# Patient Record
Sex: FEMALE | Race: OTHER | Employment: UNEMPLOYED | URBAN - METROPOLITAN AREA
[De-identification: names, ages, dates, MRNs, and addresses within clinical notes are randomized per-mention and may not be internally consistent; named-entity substitution may affect disease eponyms.]

---

## 2017-08-08 ENCOUNTER — HOSPITAL ENCOUNTER (EMERGENCY)
Age: 32
Discharge: HOME | End: 2017-08-09
Attending: EMERGENCY MEDICINE | Admitting: EMERGENCY MEDICINE
Payer: SELF-PAY

## 2017-08-08 VITALS
TEMPERATURE: 98.1 F | OXYGEN SATURATION: 100 % | DIASTOLIC BLOOD PRESSURE: 85 MMHG | RESPIRATION RATE: 18 BRPM | SYSTOLIC BLOOD PRESSURE: 133 MMHG | HEART RATE: 74 BPM

## 2017-08-08 DIAGNOSIS — R11.0 NAUSEA WITHOUT VOMITING: ICD-10-CM

## 2017-08-08 DIAGNOSIS — R10.13 ABDOMINAL PAIN, ACUTE, EPIGASTRIC: Primary | ICD-10-CM

## 2017-08-08 PROCEDURE — 96361 HYDRATE IV INFUSION ADD-ON: CPT

## 2017-08-08 PROCEDURE — 96374 THER/PROPH/DIAG INJ IV PUSH: CPT

## 2017-08-08 PROCEDURE — 99283 EMERGENCY DEPT VISIT LOW MDM: CPT

## 2017-08-08 PROCEDURE — 96375 TX/PRO/DX INJ NEW DRUG ADDON: CPT

## 2017-08-08 NOTE — LETTER
NOTIFICATION RETURN TO WORK / SCHOOL 
 
8/9/2017 1:20 AM 
 
Ms. Faustina Carmichael [address] [facility] [address] [id number] To Whom It May Concern: 
 
Faustina Carmichael is currently under the care of [facility] EMERGENCY DEPT. She will return to work/school on: 8/10/17 If there are questions or concerns please have the patient contact our office. Sincerely, Gilmer Mon MD

## 2017-08-09 LAB
ALBUMIN SERPL BCP-MCNC: 3.3 G/DL (ref 3.4–5)
ALBUMIN/GLOB SERPL: 0.9 {RATIO} (ref 0.8–1.7)
ALP SERPL-CCNC: 115 U/L (ref 45–117)
ALT SERPL-CCNC: 37 U/L (ref 13–56)
ANION GAP BLD CALC-SCNC: 7 MMOL/L (ref 3–18)
APPEARANCE UR: NORMAL
AST SERPL W P-5'-P-CCNC: 21 U/L (ref 15–37)
BASOPHILS # BLD AUTO: 0 K/UL (ref 0–0.06)
BASOPHILS # BLD: 0 % (ref 0–2)
BILIRUB DIRECT SERPL-MCNC: <0.1 MG/DL (ref 0–0.2)
BILIRUB SERPL-MCNC: 0.2 MG/DL (ref 0.2–1)
BILIRUB UR QL: NEGATIVE
BUN SERPL-MCNC: 13 MG/DL (ref 7–18)
BUN/CREAT SERPL: 20 (ref 12–20)
CALCIUM SERPL-MCNC: 8.7 MG/DL (ref 8.5–10.1)
CHLORIDE SERPL-SCNC: 106 MMOL/L (ref 100–108)
CO2 SERPL-SCNC: 27 MMOL/L (ref 21–32)
COLOR UR: YELLOW
CREAT SERPL-MCNC: 0.66 MG/DL (ref 0.6–1.3)
DIFFERENTIAL METHOD BLD: ABNORMAL
EOSINOPHIL # BLD: 0.6 K/UL (ref 0–0.4)
EOSINOPHIL NFR BLD: 7 % (ref 0–5)
ERYTHROCYTE [DISTWIDTH] IN BLOOD BY AUTOMATED COUNT: 13.5 % (ref 11.6–14.5)
GLOBULIN SER CALC-MCNC: 3.7 G/DL (ref 2–4)
GLUCOSE SERPL-MCNC: 101 MG/DL (ref 74–99)
GLUCOSE UR STRIP.AUTO-MCNC: NEGATIVE MG/DL
HCG UR QL: NEGATIVE
HCT VFR BLD AUTO: 40.1 % (ref 35–45)
HGB BLD-MCNC: 13.3 G/DL (ref 12–16)
HGB UR QL STRIP: NEGATIVE
KETONES UR QL STRIP.AUTO: NEGATIVE MG/DL
LEUKOCYTE ESTERASE UR QL STRIP.AUTO: NEGATIVE
LIPASE SERPL-CCNC: 121 U/L (ref 73–393)
LYMPHOCYTES # BLD AUTO: 34 % (ref 21–52)
LYMPHOCYTES # BLD: 2.8 K/UL (ref 0.9–3.6)
MCH RBC QN AUTO: 30.4 PG (ref 24–34)
MCHC RBC AUTO-ENTMCNC: 33.2 G/DL (ref 31–37)
MCV RBC AUTO: 91.8 FL (ref 74–97)
MONOCYTES # BLD: 0.9 K/UL (ref 0.05–1.2)
MONOCYTES NFR BLD AUTO: 10 % (ref 3–10)
NEUTS SEG # BLD: 4 K/UL (ref 1.8–8)
NEUTS SEG NFR BLD AUTO: 49 % (ref 40–73)
NITRITE UR QL STRIP.AUTO: NEGATIVE
PH UR STRIP: 8 [PH] (ref 5–8)
PLATELET # BLD AUTO: 177 K/UL (ref 135–420)
PMV BLD AUTO: 11.9 FL (ref 9.2–11.8)
POTASSIUM SERPL-SCNC: 3.7 MMOL/L (ref 3.5–5.5)
PROT SERPL-MCNC: 7 G/DL (ref 6.4–8.2)
PROT UR STRIP-MCNC: NEGATIVE MG/DL
RBC # BLD AUTO: 4.37 M/UL (ref 4.2–5.3)
SODIUM SERPL-SCNC: 140 MMOL/L (ref 136–145)
SP GR UR REFRACTOMETRY: 1.02 (ref 1–1.03)
UROBILINOGEN UR QL STRIP.AUTO: 1 EU/DL (ref 0.2–1)
WBC # BLD AUTO: 8.3 K/UL (ref 4.6–13.2)

## 2017-08-09 PROCEDURE — 83690 ASSAY OF LIPASE: CPT | Performed by: EMERGENCY MEDICINE

## 2017-08-09 PROCEDURE — 74011250636 HC RX REV CODE- 250/636: Performed by: EMERGENCY MEDICINE

## 2017-08-09 PROCEDURE — 81003 URINALYSIS AUTO W/O SCOPE: CPT | Performed by: EMERGENCY MEDICINE

## 2017-08-09 PROCEDURE — 74011000250 HC RX REV CODE- 250: Performed by: EMERGENCY MEDICINE

## 2017-08-09 PROCEDURE — 80076 HEPATIC FUNCTION PANEL: CPT | Performed by: EMERGENCY MEDICINE

## 2017-08-09 PROCEDURE — 81025 URINE PREGNANCY TEST: CPT | Performed by: EMERGENCY MEDICINE

## 2017-08-09 PROCEDURE — 85025 COMPLETE CBC W/AUTO DIFF WBC: CPT | Performed by: EMERGENCY MEDICINE

## 2017-08-09 PROCEDURE — 80048 BASIC METABOLIC PNL TOTAL CA: CPT | Performed by: EMERGENCY MEDICINE

## 2017-08-09 RX ORDER — KETOROLAC TROMETHAMINE 30 MG/ML
INJECTION, SOLUTION INTRAMUSCULAR; INTRAVENOUS
Status: DISCONTINUED
Start: 2017-08-09 | End: 2017-08-09 | Stop reason: HOSPADM

## 2017-08-09 RX ORDER — FAMOTIDINE 10 MG/ML
INJECTION INTRAVENOUS
Status: DISCONTINUED
Start: 2017-08-09 | End: 2017-08-09 | Stop reason: HOSPADM

## 2017-08-09 RX ORDER — FAMOTIDINE 10 MG/ML
20 INJECTION INTRAVENOUS
Status: COMPLETED | OUTPATIENT
Start: 2017-08-09 | End: 2017-08-09

## 2017-08-09 RX ORDER — KETOROLAC TROMETHAMINE 30 MG/ML
30 INJECTION, SOLUTION INTRAMUSCULAR; INTRAVENOUS
Status: COMPLETED | OUTPATIENT
Start: 2017-08-09 | End: 2017-08-09

## 2017-08-09 RX ORDER — OMEPRAZOLE 20 MG/1
20 CAPSULE, DELAYED RELEASE ORAL DAILY
Qty: 14 CAP | Refills: 1 | Status: SHIPPED | OUTPATIENT
Start: 2017-08-09 | End: 2017-08-23

## 2017-08-09 RX ORDER — ACETAMINOPHEN 500 MG
1000 TABLET ORAL
Qty: 30 TAB | Refills: 0 | Status: SHIPPED | OUTPATIENT
Start: 2017-08-09 | End: 2018-09-28

## 2017-08-09 RX ORDER — ONDANSETRON 4 MG/1
4 TABLET, ORALLY DISINTEGRATING ORAL
Qty: 8 TAB | Refills: 0 | Status: SHIPPED | OUTPATIENT
Start: 2017-08-09 | End: 2018-07-27 | Stop reason: ALTCHOICE

## 2017-08-09 RX ORDER — MAG HYDROX/ALUMINUM HYD/SIMETH 200-200-20
30 SUSPENSION, ORAL (FINAL DOSE FORM) ORAL
Qty: 354 ML | Refills: 0 | Status: SHIPPED | OUTPATIENT
Start: 2017-08-09

## 2017-08-09 RX ORDER — ONDANSETRON 2 MG/ML
4 INJECTION INTRAMUSCULAR; INTRAVENOUS
Status: COMPLETED | OUTPATIENT
Start: 2017-08-09 | End: 2017-08-09

## 2017-08-09 RX ADMIN — ONDANSETRON 4 MG: 2 INJECTION INTRAMUSCULAR; INTRAVENOUS at 00:23

## 2017-08-09 RX ADMIN — KETOROLAC TROMETHAMINE 30 MG: 30 INJECTION, SOLUTION INTRAMUSCULAR at 00:36

## 2017-08-09 RX ADMIN — SODIUM CHLORIDE 1000 ML: 900 INJECTION, SOLUTION INTRAVENOUS at 00:23

## 2017-08-09 RX ADMIN — FAMOTIDINE 20 MG: 10 INJECTION, SOLUTION INTRAVENOUS at 00:36

## 2017-08-09 NOTE — ED PROVIDER NOTES
HPI Comments: Amanda Awad is a 27 y.o. Female with c/o upper mid abd pain, radiating to back for last 3 days with nausea. Worse with po intake. No vomiting, diarrhea, fcs, cp, cough, urinary sx. No h/o sig abd issues in past. Sharp, constant. Nothing taken    The history is provided by the patient. History reviewed. No pertinent past medical history. History reviewed. No pertinent surgical history. History reviewed. No pertinent family history. Social History     Social History    Marital status:      Spouse name: N/A    Number of children: N/A    Years of education: N/A     Occupational History    Not on file. Social History Main Topics    Smoking status: Never Smoker    Smokeless tobacco: Never Used    Alcohol use No    Drug use: No    Sexual activity: Not on file     Other Topics Concern    Not on file     Social History Narrative    No narrative on file         ALLERGIES: Review of patient's allergies indicates no known allergies. Review of Systems   Constitutional: Negative for fever. HENT: Negative for sore throat and trouble swallowing. Eyes: Negative for visual disturbance. Respiratory: Negative for cough and shortness of breath. Cardiovascular: Negative for chest pain. Gastrointestinal: Positive for abdominal pain and nausea. Negative for blood in stool, diarrhea and vomiting. Endocrine: Negative for polyuria. Genitourinary: Negative for difficulty urinating. Musculoskeletal: Negative for gait problem. Skin: Negative for rash. Allergic/Immunologic: Negative for immunocompromised state. Neurological: Negative for syncope. Hematological: Does not bruise/bleed easily. Psychiatric/Behavioral: Positive for sleep disturbance. Vitals:    08/08/17 2341   BP: 133/85   Pulse: 74   Resp: 18   Temp: 98.1 °F (36.7 °C)   SpO2: 100%            Physical Exam   Constitutional: She is oriented to person, place, and time.  She appears well-developed and well-nourished. Non-toxic appearance. She does not have a sickly appearance. She does not appear ill. No distress. HENT:   Head: Normocephalic and atraumatic. Right Ear: External ear normal.   Left Ear: External ear normal.   Nose: Nose normal.   Mouth/Throat: Uvula is midline, oropharynx is clear and moist and mucous membranes are normal.   Eyes: Conjunctivae are normal. No scleral icterus. Neck: Neck supple. Cardiovascular: Normal rate, regular rhythm, normal heart sounds and intact distal pulses. Pulmonary/Chest: Effort normal and breath sounds normal.   Abdominal: Soft. Normal appearance. She exhibits no distension and no mass. There is no hepatosplenomegaly. There is tenderness in the epigastric area. There is no rigidity, no rebound, no guarding, no CVA tenderness, no tenderness at McBurney's point and negative Alvarenga's sign. Musculoskeletal: She exhibits no edema. Neurological: She is alert and oriented to person, place, and time. Gait normal.   Skin: Skin is warm and dry. She is not diaphoretic. Psychiatric: Her behavior is normal.   Nursing note and vitals reviewed.        [facility]  ED Course       Procedures    Vitals:  Patient Vitals for the past 12 hrs:   Temp Pulse Resp BP SpO2   08/08/17 2341 98.1 °F (36.7 °C) 74 18 133/85 100 %         Medications ordered:   Medications   sodium chloride 0.9 % bolus infusion 1,000 mL (1,000 mL IntraVENous New Bag 8/9/17 0023)   ketorolac (TORADOL) 30 mg/mL (1 mL) injection (not administered)   famotidine (PF) (PEPCID) 20 mg/2 mL injection (not administered)   ondansetron (ZOFRAN) injection 4 mg (4 mg IntraVENous Given 8/9/17 0023)   ketorolac (TORADOL) injection 30 mg (30 mg IntraVENous Given 8/9/17 0036)   famotidine (PF) (PEPCID) injection 20 mg (20 mg IntraVENous Given 8/9/17 0036)         Lab findings:  Recent Results (from the past 12 hour(s))   CBC WITH AUTOMATED DIFF    Collection Time: 08/09/17 12:18 AM   Result Value Ref Range    WBC 8.3 4.6 - 13.2 K/uL    RBC 4.37 4.20 - 5.30 M/uL    HGB 13.3 12.0 - 16.0 g/dL    HCT 40.1 35.0 - 45.0 %    MCV 91.8 74.0 - 97.0 FL    MCH 30.4 24.0 - 34.0 PG    MCHC 33.2 31.0 - 37.0 g/dL    RDW 13.5 11.6 - 14.5 %    PLATELET 543 866 - 982 K/uL    MPV 11.9 (H) 9.2 - 11.8 FL    NEUTROPHILS 49 40 - 73 %    LYMPHOCYTES 34 21 - 52 %    MONOCYTES 10 3 - 10 %    EOSINOPHILS 7 (H) 0 - 5 %    BASOPHILS 0 0 - 2 %    ABS. NEUTROPHILS 4.0 1.8 - 8.0 K/UL    ABS. LYMPHOCYTES 2.8 0.9 - 3.6 K/UL    ABS. MONOCYTES 0.9 0.05 - 1.2 K/UL    ABS. EOSINOPHILS 0.6 (H) 0.0 - 0.4 K/UL    ABS. BASOPHILS 0.0 0.0 - 0.06 K/UL    DF AUTOMATED     HEPATIC FUNCTION PANEL    Collection Time: 08/09/17 12:18 AM   Result Value Ref Range    Protein, total 7.0 6.4 - 8.2 g/dL    Albumin 3.3 (L) 3.4 - 5.0 g/dL    Globulin 3.7 2.0 - 4.0 g/dL    A-G Ratio 0.9 0.8 - 1.7      Bilirubin, total 0.2 0.2 - 1.0 MG/DL    Bilirubin, direct <0.1 0.0 - 0.2 MG/DL    Alk.  phosphatase 115 45 - 117 U/L    AST (SGOT) 21 15 - 37 U/L    ALT (SGPT) 37 13 - 56 U/L   LIPASE    Collection Time: 08/09/17 12:18 AM   Result Value Ref Range    Lipase 121 73 - 932 U/L   METABOLIC PANEL, BASIC    Collection Time: 08/09/17 12:18 AM   Result Value Ref Range    Sodium 140 136 - 145 mmol/L    Potassium 3.7 3.5 - 5.5 mmol/L    Chloride 106 100 - 108 mmol/L    CO2 27 21 - 32 mmol/L    Anion gap 7 3.0 - 18 mmol/L    Glucose 101 (H) 74 - 99 mg/dL    BUN 13 7.0 - 18 MG/DL    Creatinine 0.66 0.6 - 1.3 MG/DL    BUN/Creatinine ratio 20 12 - 20      GFR est AA >60 >60 ml/min/1.73m2    GFR est non-AA >60 >60 ml/min/1.73m2    Calcium 8.7 8.5 - 10.1 MG/DL   URINALYSIS W/ RFLX MICROSCOPIC    Collection Time: 08/09/17 12:24 AM   Result Value Ref Range    Color YELLOW      Appearance CLOUDY      Specific gravity 1.021 1.005 - 1.030      pH (UA) 8.0 5.0 - 8.0      Protein NEGATIVE  NEG mg/dL    Glucose NEGATIVE  NEG mg/dL    Ketone NEGATIVE  NEG mg/dL    Bilirubin NEGATIVE  NEG      Blood NEGATIVE  NEG      Urobilinogen 1.0 0.2 - 1.0 EU/dL    Nitrites NEGATIVE  NEG      Leukocyte Esterase NEGATIVE  NEG     HCG URINE, QL    Collection Time: 08/09/17 12:24 AM   Result Value Ref Range    HCG urine, Ql. NEGATIVE  NEG         EKG interpretation by ED Physician:      X-Ray, CT or other radiology findings or impressions:  No orders to display       Progress notes, Consult notes or additional Procedure notes:   Doubt need for imaging; suspect gastritis, or gi related issue that does not require additional work up  I have discussed with patient and/or family/sig other the results, interpretation of any imaging if performed, suspected diagnosis and treatment plan to include instructions regarding the diagnoses listed to which understanding was expressed with all questions answered      Reevaluation of patient:   Stable for dc    Disposition:  Diagnosis:   1. Abdominal pain, acute, epigastric    2. Nausea without vomiting        Disposition: home      Follow-up Information     Follow up With Details Comments [address] [facility] ([address] [facility]) Schedule an appointment as soon as possible for a visit  [address] EMERGENCY DEPT  If symptoms worsen [address] [address] [phone]            Patient's Medications   Start Taking    ACETAMINOPHEN (TYLENOL EXTRA STRENGTH) 500 MG TABLET    Take 2 Tabs by mouth every six (6) hours as needed for Pain. ALUM-MAG HYDROXIDE-SIMETH (MYLANTA) 200-200-20 MG/5 ML SUSP    Take 30 mL by mouth four (4) times daily as needed. OMEPRAZOLE (PRILOSEC) 20 MG CAPSULE    Take 1 Cap by mouth daily for 14 days. ONDANSETRON (ZOFRAN ODT) 4 MG DISINTEGRATING TABLET    Take 1 Tab by mouth every eight (8) hours as needed for Nausea.    Continue Taking    No medications on file   These Medications have changed    No medications on file   Stop Taking    No medications on file

## 2017-08-09 NOTE — ED NOTES
Purposeful rounding complete:    Side rails up x2: YES  Bed low and wheels are locked: YES  Call bell in reach: YES  Comfort Addressed: YES  Toileting needs addressed: YES  Plan of care reviewed/updated with patient and family members: YES  IV site addressed: YES  Pain assessed and addressed: YES  Pain level:8    Patient provided a warm blanket for comfort.

## 2017-08-09 NOTE — ED NOTES
Patient lying in bed with visitor sitting on the end of the bed. Patient talking and playing a game on her cell phone with no noted distress at this time.

## 2017-08-09 NOTE — ED TRIAGE NOTES
Pt c/o upper abd pain with nausea that started 3 days ago. Denies vomiting or diarrhea. States she also has a headache.

## 2017-08-13 ENCOUNTER — HOSPITAL ENCOUNTER (OUTPATIENT)
Age: 32
Setting detail: OBSERVATION
Discharge: HOME | End: 2017-08-15
Attending: EMERGENCY MEDICINE | Admitting: HOSPITALIST
Payer: SELF-PAY

## 2017-08-13 ENCOUNTER — APPOINTMENT (OUTPATIENT)
Dept: CT IMAGING | Age: 32
End: 2017-08-13
Attending: EMERGENCY MEDICINE
Payer: SELF-PAY

## 2017-08-13 DIAGNOSIS — R42 DIZZINESS: Primary | ICD-10-CM

## 2017-08-13 LAB
ANION GAP BLD CALC-SCNC: 10 MMOL/L (ref 3–18)
APPEARANCE UR: CLEAR
BACTERIA URNS QL MICRO: ABNORMAL /HPF
BASOPHILS # BLD AUTO: 0 K/UL (ref 0–0.06)
BASOPHILS # BLD: 0 % (ref 0–2)
BILIRUB UR QL: NEGATIVE
BUN SERPL-MCNC: 13 MG/DL (ref 7–18)
BUN/CREAT SERPL: 21 (ref 12–20)
CALCIUM SERPL-MCNC: 8.7 MG/DL (ref 8.5–10.1)
CHLORIDE SERPL-SCNC: 109 MMOL/L (ref 100–108)
CO2 SERPL-SCNC: 24 MMOL/L (ref 21–32)
COLOR UR: YELLOW
CREAT SERPL-MCNC: 0.63 MG/DL (ref 0.6–1.3)
DIFFERENTIAL METHOD BLD: ABNORMAL
EOSINOPHIL # BLD: 0.5 K/UL (ref 0–0.4)
EOSINOPHIL NFR BLD: 6 % (ref 0–5)
EPITH CASTS URNS QL MICRO: ABNORMAL /LPF (ref 0–5)
ERYTHROCYTE [DISTWIDTH] IN BLOOD BY AUTOMATED COUNT: 13.3 % (ref 11.6–14.5)
GLUCOSE SERPL-MCNC: 86 MG/DL (ref 74–99)
GLUCOSE UR STRIP.AUTO-MCNC: NEGATIVE MG/DL
HCG UR QL: NEGATIVE
HCT VFR BLD AUTO: 40.7 % (ref 35–45)
HGB BLD-MCNC: 13.8 G/DL (ref 12–16)
HGB UR QL STRIP: NEGATIVE
KETONES UR QL STRIP.AUTO: NEGATIVE MG/DL
LEUKOCYTE ESTERASE UR QL STRIP.AUTO: ABNORMAL
LYMPHOCYTES # BLD AUTO: 30 % (ref 21–52)
LYMPHOCYTES # BLD: 2.5 K/UL (ref 0.9–3.6)
MAGNESIUM SERPL-MCNC: 2.2 MG/DL (ref 1.6–2.6)
MCH RBC QN AUTO: 30.9 PG (ref 24–34)
MCHC RBC AUTO-ENTMCNC: 33.9 G/DL (ref 31–37)
MCV RBC AUTO: 91.1 FL (ref 74–97)
MONOCYTES # BLD: 0.9 K/UL (ref 0.05–1.2)
MONOCYTES NFR BLD AUTO: 11 % (ref 3–10)
NEUTS SEG # BLD: 4.4 K/UL (ref 1.8–8)
NEUTS SEG NFR BLD AUTO: 53 % (ref 40–73)
NITRITE UR QL STRIP.AUTO: NEGATIVE
PH UR STRIP: 7 [PH] (ref 5–8)
PLATELET # BLD AUTO: 166 K/UL (ref 135–420)
PMV BLD AUTO: 11.4 FL (ref 9.2–11.8)
POTASSIUM SERPL-SCNC: 3.9 MMOL/L (ref 3.5–5.5)
PROT UR STRIP-MCNC: NEGATIVE MG/DL
RBC # BLD AUTO: 4.47 M/UL (ref 4.2–5.3)
RBC #/AREA URNS HPF: ABNORMAL /HPF (ref 0–5)
SODIUM SERPL-SCNC: 143 MMOL/L (ref 136–145)
SP GR UR REFRACTOMETRY: 1 (ref 1–1.03)
UROBILINOGEN UR QL STRIP.AUTO: 0.2 EU/DL (ref 0.2–1)
WBC # BLD AUTO: 8.3 K/UL (ref 4.6–13.2)
WBC URNS QL MICRO: ABNORMAL /HPF (ref 0–4)

## 2017-08-13 PROCEDURE — 96361 HYDRATE IV INFUSION ADD-ON: CPT

## 2017-08-13 PROCEDURE — 70450 CT HEAD/BRAIN W/O DYE: CPT

## 2017-08-13 PROCEDURE — 74011250636 HC RX REV CODE- 250/636: Performed by: EMERGENCY MEDICINE

## 2017-08-13 PROCEDURE — 96360 HYDRATION IV INFUSION INIT: CPT

## 2017-08-13 PROCEDURE — 80048 BASIC METABOLIC PNL TOTAL CA: CPT | Performed by: EMERGENCY MEDICINE

## 2017-08-13 PROCEDURE — 81025 URINE PREGNANCY TEST: CPT | Performed by: EMERGENCY MEDICINE

## 2017-08-13 PROCEDURE — 83735 ASSAY OF MAGNESIUM: CPT | Performed by: EMERGENCY MEDICINE

## 2017-08-13 PROCEDURE — 99285 EMERGENCY DEPT VISIT HI MDM: CPT

## 2017-08-13 PROCEDURE — 74011250637 HC RX REV CODE- 250/637: Performed by: EMERGENCY MEDICINE

## 2017-08-13 PROCEDURE — 81001 URINALYSIS AUTO W/SCOPE: CPT | Performed by: EMERGENCY MEDICINE

## 2017-08-13 PROCEDURE — 96372 THER/PROPH/DIAG INJ SC/IM: CPT

## 2017-08-13 PROCEDURE — 85025 COMPLETE CBC W/AUTO DIFF WBC: CPT | Performed by: EMERGENCY MEDICINE

## 2017-08-13 RX ORDER — ACETAMINOPHEN 325 MG/1
650 TABLET ORAL
Status: COMPLETED | OUTPATIENT
Start: 2017-08-13 | End: 2017-08-13

## 2017-08-13 RX ORDER — MECLIZINE HCL 12.5 MG 12.5 MG/1
50 TABLET ORAL
Status: COMPLETED | OUTPATIENT
Start: 2017-08-13 | End: 2017-08-13

## 2017-08-13 RX ORDER — SODIUM CHLORIDE 9 MG/ML
1000 INJECTION, SOLUTION INTRAVENOUS ONCE
Status: COMPLETED | OUTPATIENT
Start: 2017-08-13 | End: 2017-08-13

## 2017-08-13 RX ADMIN — ACETAMINOPHEN 650 MG: 325 TABLET, FILM COATED ORAL at 22:11

## 2017-08-13 RX ADMIN — MECLIZINE 50 MG: 12.5 TABLET ORAL at 22:11

## 2017-08-13 RX ADMIN — SODIUM CHLORIDE 1000 ML: 900 INJECTION, SOLUTION INTRAVENOUS at 22:14

## 2017-08-13 NOTE — IP AVS SNAPSHOT
[address] [address] 
[phone] Patient: Mahendra Navarro MRN: [id number] :[id number] Current Discharge Medication List  
  
CONTINUE these medications which have NOT CHANGED Dose & Instructions Dispensing Information Comments Morning Noon Evening Bedtime  
 acetaminophen 500 mg tablet Commonly known as:  [address] [facility] Your last dose was: Your next dose is:    
   
   
 Dose:  1000 mg Take 2 Tabs by mouth every six (6) hours as needed for Pain. Quantity:  30 Tab Refills:  0  
     
   
   
   
  
 alum-mag hydroxide-simeth 200-200-20 mg/5 mL Susp Commonly known as:  MYLANTA Your last dose was: Your next dose is:    
   
   
 Dose:  30 mL Take 30 mL by mouth four (4) times daily as needed. Quantity:  354 mL Refills:  0  
     
   
   
   
  
 omeprazole 20 mg capsule Commonly known as:  PRILOSEC Your last dose was: Your next dose is:    
   
   
 Dose:  20 mg Take 1 Cap by mouth daily for 14 days. Quantity:  14 Cap Refills:  1  
     
   
   
   
  
 ondansetron 4 mg disintegrating tablet Commonly known as:  ZOFRAN ODT Your last dose was: Your next dose is:    
   
   
 Dose:  4 mg Take 1 Tab by mouth every eight (8) hours as needed for Nausea. Quantity:  8 Tab Refills:  0

## 2017-08-13 NOTE — IP AVS SNAPSHOT
[address] [place] [address] 
[phone] Patient: Amanda Awad MRN: [id number] :1985 You are allergic to the following No active allergies Recent Documentation Height Weight BMI Smoking Status 1.6 m 74.8 kg 29.23 kg/m2 Never Smoker Emergency Contacts Name Discharge Info Relation Home Work Mobile Anjum Collazo DISCHARGE CAREGIVER [3] Spouse [3] [phone] About your hospitalization You were admitted on:  2017 You last received care in the:  [facility] [address] You were discharged on:  August 15, 2017 Unit phone number:  [phone] Why you were hospitalized Your primary diagnosis was:  Hypotension Your diagnoses also included:  Dizziness Providers Seen During Your Hospitalizations Provider Role Specialty Primary office phone Neomia Bence, DO Attending Provider Emergency Medicine [phone] Sobeida Licea MD Attending Provider Internal Medicine [phone] Najma Vu DO Attending Provider Internal Medicine [phone] Your Primary Care Physician (PCP) Primary Care Physician Office Phone Office Fax John Villarreal [phone] Follow-up Information Follow up With Details Comments Contact Info Find a primary care doctor [facility] Schedule an appointment as soon as possible for a visit  [address] [place] [address] [address] [place] [address]  
[phone] Fahad Martinez NP On 2017 at 1:00 pm [place] [address] [address] [phone] 
[phone] Your Appointments 2017  1:30 PM EDT [place] [address] [address] [facility] [address] [phone]  
[phone] Current Discharge Medication List  
  
 CONTINUE these medications which have NOT CHANGED Dose & Instructions Dispensing Information Comments Morning Noon Evening Bedtime  
 acetaminophen 500 mg tablet Commonly known as:  [address] Your last dose was: Your next dose is:    
   
   
 Dose:  1000 mg Take 2 Tabs by mouth every six (6) hours as needed for Pain. Quantity:  30 Tab Refills:  0  
     
   
   
   
  
 alum-mag hydroxide-simeth 200-200-20 mg/5 mL Susp Commonly known as:  MYLANTA Your last dose was: Your next dose is:    
   
   
 Dose:  30 mL Take 30 mL by mouth four (4) times daily as needed. Quantity:  354 mL Refills:  0  
     
   
   
   
  
 omeprazole 20 mg capsule Commonly known as:  PRILOSEC Your last dose was: Your next dose is:    
   
   
 Dose:  20 mg Take 1 Cap by mouth daily for 14 days. Quantity:  14 Cap Refills:  1  
     
   
   
   
  
 ondansetron 4 mg disintegrating tablet Commonly known as:  ZOFRAN ODT Your last dose was: Your next dose is:    
   
   
 Dose:  4 mg Take 1 Tab by mouth every eight (8) hours as needed for Nausea. Quantity:  8 Tab Refills:  0 Discharge Instructions CHECK YOUR BLOOD PRESSURE DAILY, CHECK WHEN LIGHT HEADED 
 
EAT A HIGH SALT DIET 
 
 
 
DISCHARGE SUMMARY from Nurse The following personal items are in your possession at time of discharge: 
  
  
PATIENT INSTRUCTIONS: 
 
 
F-face looks uneven A-arms unable to move or move unevenly S-speech slurred or non-existent T-time-call 911 as soon as signs and symptoms begin-DO NOT go Back to bed or wait to see if you get better-TIME IS BRAIN.  
 
Warning Signs of HEART ATTACK  
 
 Call 911 if you have these symptoms: 
? Chest discomfort. Most heart attacks involve discomfort in the center of the chest that lasts more than a few minutes, or that goes away and comes back. It can feel like uncomfortable pressure, squeezing, fullness, or pain. ? Discomfort in other areas of the upper body. Symptoms can include pain or discomfort in one or both arms, the back, neck, jaw, or stomach. ? Shortness of breath with or without chest discomfort. ? Other signs may include breaking out in a cold sweat, nausea, or lightheadedness. Don't wait more than five minutes to call [address]! Fast action can save your life. Calling 911 is almost always the fastest way to get lifesaving treatment. Emergency Medical Services staff can begin treatment when they arrive  up to an hour sooner than if someone gets to the hospital by car. The discharge information has been reviewed with the patient. The patient verbalized understanding. Discharge medications reviewed with the patient and appropriate educational materials and side effects teaching were provided. Presión arterial baja: Instrucciones de cuidado - [ Low Blood Pressure: Care Instructions ] Instrucciones de cuidado La presión arterial es la medida de la fuerza que ejerce la christofer contra las salas de los vasos sanguíneos briana y después de cada latido del corazón. La presión arterial baja (hipotensión) significa que la presión arterial está mucho más baja de lo normal. Algunas personas, especialmente las mujeres jóvenes y [place], podrían tener larisa presión ligeramente baja sin síntomas. Sin embargo, en [employer] la presión arterial baja puede causar síntomas dirk mareo o aturdimiento. Cuando la presión arterial está demasiado baja, el corazón, el cerebro y otros órganos no reciben suficiente christofer.  
La presión arterial baja puede tener muchas causas, dirk problemas cardíacos y algunos medicamentos. La diabetes no controlada, larisa reacción alérgica grave o larisa infección pueden hacer que la presión arterial baje. Otra causa es la deshidratación, es decir, cuando el cuerpo pierde demasiado líquido. El tratamiento para la presión arterial baja depende de la causa. La atención de seguimiento es larisa parte clave de dickerson tratamiento y seguridad. Asegúrese de hacer y acudir a todas las citas, y llame a dickerson médico si está teniendo problemas. También es larisa buena idea saber los resultados de nora exámenes y mantener larisa lista de los medicamentos que waqar. Cómo puede cuidarse en el hogar? · Tara abundantes líquidos, los suficientes para que dickerson orina sea de color amarillo steven o transparente dirk el agua. Si tiene [place] [employer] [facility] [employer], el corazón o el hígado y tiene que Frandy's líquidos, hable con dickerson médico antes de aumentar dickerson consumo. · Sea curly con los medicamentos. Llame a dickerson médico si tootie estar teniendo un problema con dickerson medicamento. Recibirá [employer] medicamentos específicos recetados por dickerson médico. 
· Póngase de pie o levántese de la cama muy lentamente para que el cuerpo se ajuste. · Descanse lo suficiente. · No fume. Fumar aumenta el riesgo de tener un ataque al corazón. Si necesita ayuda para dejar de fumar, hable con dickerson médico sobre programas y medicamentos para dejar de fumar. Estos pueden aumentar nora probabilidades de dejar el hábito para siempre. · Limite el alcohol a 2 bebidas al día si es hombre, y 1 bebida al día si es tima. El alcohol podría interferir en la acción de dickerson medicamento. Además, el alcohol puede hacer que la presión arterial baja empeore ya que hace que el cuerpo pierda agua. Cuándo debe pedir ayuda? Llame al 911 en cualquier momento que considere que necesita atención de [place]. Por ejemplo, llame si: · Tiene síntomas de un ataque al corazón. Estos pueden incluir: ¨ Dolor o presión en el pecho, o larisa sensación extraña en el pecho. ¨ Sudoración. ¨ Falta de aire. ¨ Náuseas o vómito. ¨ Dolor, presión o larisa sensación extraña en la espalda, el elle, la mandíbula, la parte superior del abdomen o en addi o ambos hombros o brazos. ¨ Aturdimiento o debilidad repentina. ¨ Latidos del corazón rápidos o irregulares. Después de llamar al 911, es posible que el operador le diga que mastique 1 aspirina para adultos o de 2 a 4 aspirinas de dosis baja. Espere a larisa ambulancia. No intente conducir usted mismo. · Tiene síntomas de un ataque cerebral. Estos pueden incluir: 
¨ Entumecimiento, hormigueo, debilidad o parálisis repentinos en la carol, el brazo o la pierna, sobre todo si ocurre en un solo lado del cuerpo. ¨ Cambios súbitos en la vista. ¨ Problemas repentinos para hablar. ¨ Confusión súbita o dificultad repentina para comprender frases sencillas. ¨ Problemas repentinos para caminar o mantener el equilibrio. ¨ Un dolor de dusty intenso y repentino, distinto a los cristine de dusty anteriores. · Se desmayó (perdió el conocimiento). Llame a dickerson médico ahora mismo o busque atención médica inmediata si: 
· Siente mareo o aturdimiento, o siente que se va a desmayar. · Tiene señales de necesitar más líquidos. Tiene los ojos hundidos y la boca seca, y [place] solo poca cantidad de color oscuro. · No puede retener líquidos en el estómago. Preste especial atención a los cambios en dickerson dori y asegúrese de comunicarse con dickerson médico si: 
· No mejora dirk se esperaba. Dónde puede encontrar más información en inglés? Netta Raza a http://jules-debbie.info/. Escriba C304 en la búsqueda para aprender más acerca de \"Presión arterial baja: Instrucciones de cuidado - [ Low Blood Pressure: Care Instructions ]. \" 
Revisado: 21 christine, 2016 Versión del contenido: 11.3 © 5151-4607 boarding pass, Incorporated.  Las instrucciones de cuidado fueron adaptadas bajo licencia por Good Help Connections (which disclaims liability or warranty for this information). Si usted tiene [place] afección médica o sobre estas instrucciones, siempre pregunte a dickerson profesional de dori. [facility], Incorporated niega toda garantía o responsabilidad por dickerson uso de esta información. RECOMMEND HIGH SALT DIET Mareos: Instrucciones de cuidado - [ Dizziness: Care Instructions ] Instrucciones de cuidado Rodney Litten son [place] sensación de inestabilidad o confusión en la dusty. Son distintos al vértigo, larisa sensación de que la habitación gira o de que usted se mueve o . También es distinto del aturdimiento, que es la sensación de que está a punto de desmayarse. Puede resultar difícil conocer la causa de los [place]. Algunas personas se sienten mareadas cuando tienen migrañas. A veces, los episodios gripales pueden hacer que se sienta mareado. Algunas afecciones médicas, dirk los problemas cardíacos o la presión arterial katherine, pueden hacer que se sienta mareado. Muchos medicamentos pueden causar mareos, dirk los que se usan para la presión arterial katherine, el dolor o la ansiedad. Si es un medicamento el que está causando los síntomas, dickerson médico podría recomendarle que lo cambie o deje de tomarlo. Si es un problema cardíaco, podría necesitar medicamentos para ayudar a que dickerson corazón funcione mejor. Si no hay razón aparente para los síntomas, dickerson médico podría sugerir vigilar y esperar briana un tiempo para emma si los mareos desaparecen por sí solos. La atención de seguimiento es larisa parte clave de dickerson tratamiento y seguridad. Asegúrese de hacer y acudir a todas las citas, y llame a dickerson médico si está teniendo problemas. También es larisa buena idea saber los resultados de nora exámenes y mantener larisa lista de los medicamentos que waqar. Cómo puede cuidarse en el hogar?  
· Si dickerson médico le recomienda o receta medicamentos, tómelos exactamente según las indicaciones. Llame a dickerson médico si tootie estar teniendo un problema con dickerson medicamento. · No conduzca mientras se sienta mareado. · Trate de prevenir las caídas. Mynor Solan que puede kian son: ¨ Usar tapetes antideslizantes, agregar agarraderas cerca de la tonya y usar luces nocturnas. ¨ Ordenar dickerson casa de larry manera que en los senderos no haya nada con lo que se pueda tropezar. ¨ Avisarles a familiares y [facility] que se ha estado sintiendo [place]. [place] les servirá para saber cómo ayudarle. Cuándo debe pedir ayuda? Llame al 911 en cualquier momento que considere que necesita atención de [place]. Por ejemplo, llame si: 
· Se desmayó (perdió el conocimiento). · Tiene mareos junto con síntomas de un ataque cardíaco. Estos pueden incluir: ¨ Dolor de [place], o presión o larisa sensación extraña en el pecho. ¨ Sudoración. ¨ Falta de aire. ¨ Náuseas o vómito. ¨ Dolor, presión, o larisa sensación extraña en la espalda, el elle, la mandíbula o el abdomen superior, o en addi o ambos hombros o brazos. ¨ Aturdimiento o debilidad repentina. ¨ Un latido cardíaco rápido o irregular. · Tiene síntomas de un ataque cerebral. Estos pueden incluir: 
¨ Entumecimiento, hormigueo, debilidad o parálisis repentinos en la carol, el brazo o la pierna, sobre todo si ocurre en un solo lado del cuerpo. ¨ Cambios súbitos en la vista. ¨ Problemas repentinos para hablar. ¨ Confusión súbita o dificultad repentina para comprender frases sencillas. ¨ Problemas repentinos para caminar o mantener el equilibrio. ¨ Un dolor de dusty intenso y repentino, distinto a los cristine de dusty anteriores. Llame a dickerson médico ahora mismo o busque atención médica inmediata si: 
· Se siente mareado y [place], dolor de [place] o zumbido FINXI oídos. · Tiene nuevas náuseas y vómito o [address] · Rachelle mareos no desaparecen o regresan.  
Preste especial atención a los cambios en dickerson dori y asegúrese de comunicarse con dickerson médico si: 
· No mejora dirk se esperaba. Dónde puede encontrar más información en inglés? Mike Tovar a http://jules-debbie.info/. Sera Caesar A911 en la búsqueda para aprender más acerca de \"Mareos: Instrucciones de cuidado - [ Dizziness: Care Instructions ]. \" 
Revisado: 20 marzo, 2017 Versión del contenido: 11.3 © 6652-5270 Healthwise, Incorporated. Las instrucciones de cuidado fueron adaptadas bajo licencia por Good BeMy[employer] Connections (which disclaims liability or warranty for this information). Si usted tiene [place] afección médica o sobre estas instrucciones, siempre pregunte a dickerson profesional de dori. Healthwise, Incorporated niega toda garantía o responsabilidad por dickerson uso de esta información. Aturdimiento o desmayo: Instrucciones de cuidado - [ Lurlean Rump or Faintness: Care Instructions ] Instrucciones de cuidado El aturdimiento es la sensación de que está a punto de desmayarse o de \"perder el conocimiento\". No se siente dirk si usted o lo que le rodea se [place]. Es distinto del vértigo, que es la sensación de que usted o las cosas que le rodean dan vueltas o se inclinan. El aturdimiento suele desaparecer o mejorar cuando se acuesta. Si el aturdimiento empeora, esto puede conducir a un desvanecimiento. Es común sentirse aturdido de [employer]. Por lo general, el aturdimiento no se debe a un problema grave. A menudo es causado por larisa disminución de corta duración de la presión arterial y el flujo de christofer hacia la dusty que se produce al ponerse de pie con demasiada rapidez cuando está acostado o sentado. La atención de seguimiento es larisa parte clave de dickerson tratamiento y seguridad. Asegúrese de hacer y acudir a todas las citas, y llame a dickerson médico si está teniendo problemas. También es larisa buena idea saber los resultados de nora exámenes y mantener larisa lista de los medicamentos que waqar. Cómo puede cuidarse en el hogar? · Acuéstese briana 1 o 2 minutos cuando se sienta aturdido. Después de acostarse, siéntese lentamente y permanezca sentado de 1 a 2 minutos antes de ponerse de pie lentamente. · Evite los movimientos, las posturas o las actividades que le hayan producido aturdimiento en el pasado. · Descanse mucho, en especial si está resfriado o tiene gripe, ya que estas pueden causar aturdimiento. · Asegúrese de beber abundante líquido, en especial si tiene fiebre o si ha estado sudando. · No conduzca ni se ponga a sí mismo o ponga a otros en peligro mientras se sienta aturdido. Cuándo debe pedir ayuda? Llame al 911 en cualquier momento que considere que necesita atención de [place]. Por ejemplo, llame si: · Tiene síntomas de un ataque cerebral. Estos pueden incluir: 
¨ Entumecimiento, hormigueo, debilidad o parálisis repentinos en la carol, el brazo o la pierna, sobre todo si ocurre en un solo lado del cuerpo. ¨ Cambios súbitos en la vista. ¨ Problemas repentinos para hablar. ¨ Confusión súbita o dificultad repentina para comprender frases sencillas. ¨ Problemas repentinos para caminar o mantener el equilibrio. ¨ Un dolor de dusty intenso y repentino, distinto a los cristine de dusty anteriores. · Tiene síntomas de un ataque al corazón. Estos podrían incluir: ¨ Dolor o presión en el pecho, o larisa sensación extraña en el pecho. ¨ Sudoración. ¨ Falta de aire. ¨ Náuseas o vómito. ¨ Dolor, presión o larisa sensación extraña en la espalda, el elle, la mandíbula, la parte superior del abdomen, o en addi o ambos hombros o brazos. ¨ Aturdimiento o debilidad repentina. ¨ Latidos cardíacos rápidos o irregulares. Cuando llame al 911, es posible que le digan que mastique 1 aspirina para adultos o 2 a 4 aspirinas de dosis baja. Espere a la ambulancia. No trate de conducir usted mismo un automóvil.  
Preste especial atención a los cambios en dickerson dori y asegúrese de comunicarse con dickerson médico si: 
 · El aturdimiento empeora o no mejora con los cuidados en el hogar. Dónde puede encontrar más información en inglés? Delano Dominguez a http://jules-debbie.info/. Marivel Lozano T218 en la búsqueda para aprender más acerca de \"Aturdimiento o desmayo: Instrucciones de cuidado - [ Mumtaz Neth or Faintness: Care Instructions ]. \" 
Revisado: 20 marzo, 2017 Versión del contenido: 11.3 © 1865-1857 Healthwise, Incorporated. Las instrucciones de cuidado fueron adaptadas bajo licencia por Good Help Connections (which disclaims liability or warranty for this information). Si usted tiene [place] afección médica o sobre estas instrucciones, siempre pregunte a dickerson profesional de dori. Healthwise, Incorporated niega toda garantía o responsabilidad por dickerson uso de esta información. Discharge Orders None Yieldbot Announcement We are excited to announce that we are making your provider's discharge notes available to you in Yieldbot. You will see these notes when they are completed and signed by the physician that discharged you from your recent hospital stay. If you have any questions or concerns about any information you see in Yieldbot, please call the Health Information Department where you were seen or reach out to your Primary Care Provider for more information about your plan of care. Introducing [facility]! [facility] introduce portal paciente Yieldbot . Ahora se puede acceder a partes de dickerson expediente médico, enviar por correo electrónico la oficina de dickerson médico y solicitar renovaciones de medicamentos en línea. En dickerson navegador de Internet , [place] a https://[email or web address]. [email or web address]. [email or web address]/Stream Tags Cesar clic en el usuario por Elizabeth Yung? Emilee hernándezic aquí en la sesión Lauran Holstein. Verá la página de registro [place]. Ingrese dickerson código de [facility] larry y dirk aparece a continuación.  Usted no tendrá que [employer] código después de charles completado el proceso de registro . Si usted no se inscribe antes de la fecha de caducidad , debe solicitar un nuevo código. · MyChart Código de acceso : [id number] Expires: 11/7/2017  1:26 AM 
 
Hussein Auguste los últimos cuatro dígitos de dickerson Número de Seguro Social ( xxxx ) y fecha de nacimiento ( dd / mm / aaaa ) dirk se indica y cesar clic en Enviar. Usted será llevado a la siguiente página de registro . Crear un ID MyChart . Esta será dickerson ID de inicio de sesión de MyChart y no puede ser [place] , por lo que pensar en larisa que es Debroah Laila y fácil de recordar . Crear larisa contraseña MyChart . Usted puede cambiar dickerson contraseña en cualquier momento . Ingrese dickerson Password Reset de preguntas y [place] . [place] se puede utilizar en un momento posterior si usted olvida dickerson contraseña. Introduzca dickerson dirección de correo electrónico . Leo Tirado recibirá larisa notificación por correo electrónico cuando la nueva información está disponible en MyChart . Juvencio cavazos en Registrarse. Olam Bustle emma y descargar porciones de dickerson expediente médico. 
Cesar clic en el enlace de descarga del menú Resumen para descargar larisa copia portátil de dickerson información médica . Si tiene [employer] , por favor visite la sección de preguntas frecuentes del sitio web MyChart . Recuerde, MyChart NO es que se utilizará para las necesidades urgentes. Para emergencias médicas , llame al 911 . Ahora disponible en dickerson iPhone y Android ! General Information Please provide this summary of care documentation to your next provider. Patient Signature:  ____________________________________________________________ Date:  ____________________________________________________________  
  
Juan M Ala Provider Signature:  ____________________________________________________________ Date:  ____________________________________________________________  
  
  
   
  
Ramila Ghazi 
 
 
 [address] [place] [address] [phone] Patient: Mindy Saunders MRN: [id number] :1985 Tiene alergias a lo siguiente No tiene alergias Documentación recientes Height Weight BMI ([facility]) Estatus de tabaquísmo 1.6 m 74.8 kg 29.23 kg/m2 Never Smoker Emergency Contacts Name Discharge Info Relation Home Work Mobile Collazo,Anjum DISCHARGE CAREGIVER [3] Spouse [3] [phone] Sobre avalos hospitalización Le admitieron el:  2017 [facility]os tratamiento más reciente fue el:  [address] CARDIAC TELE Le dieron de katherine el:  August 15, 2017 Número de teléfono de la unidad:  [phone] Por qué le ingresaron [facility] diagnosis primaria es:  Hypotension [facility] diagnosis también incluye:  Dizziness Proveedores de verse briana rachelle hospitalizaciones Personal Médico Rol Especialidad Teléfono principal de la oficina Craig Diaz DO Attending Provider Emergency Medicine [phone] Tanya Villanueva MD Attending Provider Internal Medicine [phone] Gonzalo Lopez DO Attending Provider Internal Medicine [phone] [facility] médico de atención primaria (PCP ) Primary Care Physician Office Phone Office Fax Diana Yeager [phone] Follow-up Information Follow up With Details Comments Contact Info Find a primary care doctor Acc Clinic Schedule an appointment as soon as possible for a visit  [address] [place] [address] [address] [facility] 
[phone] Brennen Bai NP On 2017 at 1:00 pm [address] [facility] [address] [address] 
[phone] Rachelle citas 2017  1:30 PM EDT [place] [address] [address]) [address] [facility] [address]  [address] [address]  
[phone] Aprobación de la gestión actual lista de medicamentos CONTINUAR estos medicamentos que no [facility] [place] Dosis e instrucciones Información de dispensación Comentarios Morning Noon Evening Bedtime  
 acetaminophen 500 mg tablet También conocido dirk:  TYLENOL EXTRA STRENGTH Your last dose was: Your next dose is:    
   
   
 Dosis:  1000 mg Take 2 Tabs by mouth every six (6) hours as needed for Pain. cantidad:  30 Tab  
recargas:  0  
     
   
   
   
  
 alum-mag hydroxide-simeth 563-485-76 mg/5 mL Susp También conocido dirk:  MYLANTA Your last dose was: Your next dose is:    
   
   
 Dosis:  30 mL Take 30 mL by mouth four (4) times daily as needed. cantidad:  354 mL  
recargas:  0  
     
   
   
   
  
 omeprazole 20 mg capsule También conocido dirk:  Threkarleya Jose Your last dose was: Your next dose is:    
   
   
 Dosis:  20 mg Take 1 Cap by mouth daily for 14 days. cantidad:  14 Cap  
recargas:  1  
     
   
   
   
  
 ondansetron 4 mg disintegrating tablet También conocido dirk:  ZOFRAN ODT Your last dose was: Your next dose is:    
   
   
 Dosis:  4 mg Take 1 Tab by mouth every eight (8) hours as needed for Nausea. cantidad:  8 Tab  
recargas:  0 Discharge Instructions CHECK YOUR BLOOD PRESSURE DAILY, CHECK WHEN LIGHT HEADED 
 
EAT A HIGH SALT DIET 
 
 
 
DISCHARGE SUMMARY from Nurse The following personal items are in your possession at time of discharge: 
  
  
PATIENT INSTRUCTIONS: 
 
 
F-face looks uneven A-arms unable to move or move unevenly S-speech slurred or non-existent T-time-call 911 as soon as signs and symptoms begin-DO NOT go  
 Back to bed or wait to see if you get better-TIME IS BRAIN. Warning Signs of HEART ATTACK Call 911 if you have these symptoms: 
? Chest discomfort. Most heart attacks involve discomfort in the center of the chest that lasts more than a few minutes, or that goes away and comes back. It can feel like uncomfortable pressure, squeezing, fullness, or pain. ? Discomfort in other areas of the upper body. Symptoms can include pain or discomfort in one or both arms, the back, neck, jaw, or stomach. ? Shortness of breath with or without chest discomfort. ? Other signs may include breaking out in a cold sweat, nausea, or lightheadedness. Don't wait more than five minutes to call [address]! Fast action can save your life. Calling 911 is almost always the fastest way to get lifesaving treatment. Emergency Medical Services staff can begin treatment when they arrive  up to an hour sooner than if someone gets to the hospital by car. The discharge information has been reviewed with the patient. The patient verbalized understanding. Discharge medications reviewed with the patient and appropriate educational materials and side effects teaching were provided. Presión arterial baja: Instrucciones de cuidado - [ Low Blood Pressure: Care Instructions ] Instrucciones de cuidado La presión arterial es la medida de la fuerza que ejerce la christofer contra las salas de los vasos sanguíneos briana y después de cada latido del corazón. La presión arterial baja (hipotensión) significa que la presión arterial está mucho más baja de lo normal. Algunas personas, especialmente las mujeres jóvenes y [place], podrían tener larisa presión ligeramente baja sin síntomas. Sin embargo, en [facility] la presión arterial baja puede causar síntomas dirk mareo o aturdimiento. Cuando la presión arterial está demasiado baja, el corazón, el cerebro y otros órganos no reciben suficiente christofer. La presión arterial baja puede tener muchas causas, dirk problemas cardíacos y algunos medicamentos. La diabetes no controlada, larisa reacción alérgica grave o larisa infección pueden hacer que la presión arterial baje. Otra causa es la deshidratación, es decir, cuando el cuerpo pierde demasiado líquido. El tratamiento para la presión arterial baja depende de la causa. La atención de seguimiento es larisa parte clave de dickerson tratamiento y seguridad. Asegúrese de hacer y acudir a todas las citas, y llame a dickerson médico si está teniendo problemas. También es larisa buena idea saber los resultados de nora exámenes y mantener larisa lista de los medicamentos que waqar. Cómo puede cuidarse en el hogar? · Tara abundantes líquidos, los suficientes para que dickerson orina sea de color amarillo steven o transparente dirk el agua. Si tiene [place] [facility], el corazón o el hígado y tiene que [place]'s líquidos, hable con dickerson médico antes de aumentar dickerson consumo. · Sea curly con los medicamentos. Llame a dickerson médico si tootie estar teniendo un problema con dickerson medicamento. Recibirá [employer] medicamentos específicos recetados por dickerson médico. 
· Póngase de pie o levántese de la cama muy lentamente para que el cuerpo se ajuste. · Descanse lo suficiente. · No fume. Fumar aumenta el riesgo de tener un ataque al corazón. Si necesita ayuda para dejar de fumar, hable con dickerson médico sobre programas y medicamentos para dejar de fumar. Estos pueden aumentar nora probabilidades de dejar el hábito para siempre. · Limite el alcohol a 2 bebidas al día si es hombre, y 1 bebida al día si es tima. El alcohol podría interferir en la acción de dickerson medicamento. Además, el alcohol puede hacer que la presión arterial baja empeore ya que hace que el cuerpo pierda agua. Cuándo debe pedir ayuda? Llame al 911 en cualquier momento que considere que necesita atención de [place].  Por ejemplo, llame si: 
 · Tiene síntomas de un ataque al corazón. Estos pueden incluir: ¨ Dolor o presión en el pecho, o larisa sensación extraña en el pecho. ¨ Sudoración. ¨ Falta de aire. ¨ Náuseas o vómito. ¨ Dolor, presión o larisa sensación extraña en la espalda, el elle, la mandíbula, la parte superior del abdomen o en addi o ambos hombros o brazos. ¨ Aturdimiento o debilidad repentina. ¨ Latidos del corazón rápidos o irregulares. Después de llamar al 911, es posible que el operador le diga que mastique 1 aspirina para adultos o de 2 a 4 aspirinas de dosis baja. Espere a larisa ambulancia. No intente conducir usted mismo. · Tiene síntomas de un ataque cerebral. Estos pueden incluir: 
¨ Entumecimiento, hormigueo, debilidad o parálisis repentinos en la carol, el brazo o la pierna, sobre todo si ocurre en un solo lado del cuerpo. ¨ Cambios súbitos en la vista. ¨ Problemas repentinos para hablar. ¨ Confusión súbita o dificultad repentina para comprender frases sencillas. ¨ Problemas repentinos para caminar o mantener el equilibrio. ¨ Un dolor de dusty intenso y repentino, distinto a los cristine de dusty anteriores. · Se desmayó (perdió el conocimiento). Llame a dickerson médico ahora mismo o busque atención médica inmediata si: 
· Siente mareo o aturdimiento, o siente que se va a desmayar. · Tiene señales de necesitar más líquidos. Tiene los ojos hundidos y la boca seca, y [place] solo poca cantidad de color oscuro. · No puede retener líquidos en el estómago. Preste especial atención a los cambios en dickerson dori y asegúrese de comunicarse con dickerson médico si: 
· No mejora dirk se esperaba. Dónde puede encontrar más información en inglés? Anopo Rubio a [email or web address].info/. Escriba C304 en la búsqueda para aprender más acerca de \"Presión arterial baja: Instrucciones de cuidado - [ Low Blood Pressure: Care Instructions ]. \" 
Revisado: 21 christine, 2016 Versión del contenido: 11.3 © 9963-7589 Healthwise, Incorporated. Las instrucciones de cuidado fueron adaptadas bajo licencia por Good [employer] (which disclaims liability or warranty for this information). Si usted tiene [place] afección médica o sobre estas instrucciones, siempre pregunte a dickerson profesional de dori. Healthwise, Incorporated niega toda garantía o responsabilidad por dickerson uso de esta información. RECOMMEND HIGH SALT DIET Mareos: Instrucciones de cuidado - [ Dizziness: Care Instructions ] Instrucciones de cuidado [place] son Andrea Friday sensación de inestabilidad o confusión en la dusty. Son distintos al vértigo, larisa sensación de que la habitación gira o de que usted se mueve o . También es distinto del aturdimiento, que es la sensación de que está a punto de desmayarse. Puede resultar difícil conocer la causa de los [place]. Algunas personas se sienten mareadas cuando tienen migrañas. A veces, los episodios gripales pueden hacer que se sienta mareado. Algunas afecciones médicas, dirk los problemas cardíacos o la presión arterial katherine, pueden hacer que se sienta mareado. Muchos medicamentos pueden causar mareos, dirk los que se usan para la presión arterial katherine, el dolor o la ansiedad. Si es un medicamento el que está causando los síntomas, dickerson médico podría recomendarle que lo cambie o deje de tomarlo. Si es un problema cardíaco, podría necesitar medicamentos para ayudar a que dickerson corazón funcione mejor. Si no hay razón aparente para los síntomas, dickerson médico podría sugerir vigilar y esperar briana un tiempo para emma si los mareos desaparecen por sí solos. La atención de seguimiento es larisa parte clave de dickerson tratamiento y seguridad. Asegúrese de hacer y acudir a todas las citas, y llame a dickerson médico si está teniendo problemas. También es larisa buena idea saber los resultados de nora exámenes y mantener larisa lista de los medicamentos que waqar. Cómo puede cuidarse en el hogar? · Si dickerson médico le recomienda o receta medicamentos, tómelos exactamente según las indicaciones. Llame a dickerson médico si tootie estar teniendo un problema con dickerson medicamento. · No conduzca mientras se sienta mareado. · Trate de prevenir las caídas. Justino Bolus que puede kian son: ¨ Usar tapetes antideslizantes, agregar agarraderas cerca de la tonya y usar luces nocturnas. ¨ Ordenar dickerson casa de larry manera que en los senderos no haya nada con lo que se pueda tropezar. ¨ Avisarles a familiares y [facility] que se ha estado sintiendo [place]. [place] les servirá para saber cómo ayudarle. Cuándo debe pedir ayuda? Llame al 911 en cualquier momento que considere que necesita atención de [place]. Por ejemplo, llame si: 
· Se desmayó (perdió el conocimiento). · Tiene mareos junto con síntomas de un ataque cardíaco. Estos pueden incluir: ¨ Dolor de [place], o presión o larisa sensación extraña en el pecho. ¨ Sudoración. ¨ Falta de aire. ¨ Náuseas o vómito. ¨ Dolor, presión, o larisa sensación extraña en la espalda, el elle, la mandíbula o el abdomen superior, o en addi o ambos hombros o brazos. ¨ Aturdimiento o debilidad repentina. ¨ Un latido cardíaco rápido o irregular. · Tiene síntomas de un ataque cerebral. Estos pueden incluir: 
¨ Entumecimiento, hormigueo, debilidad o parálisis repentinos en la carol, el brazo o la pierna, sobre todo si ocurre en un solo lado del cuerpo. ¨ Cambios súbitos en la vista. ¨ Problemas repentinos para hablar. ¨ Confusión súbita o dificultad repentina para comprender frases sencillas. ¨ Problemas repentinos para caminar o mantener el equilibrio. ¨ Un dolor de dusty intenso y repentino, distinto a los cristine de dusty anteriores. Llame a dickerson médico ahora mismo o busque atención médica inmediata si: 
· Se siente mareado y [place], dolor de [place] o zumbido [employer] oídos. · Tiene nuevas náuseas y vómito o [address] · Rachelle mareos no desaparecen o regresan. Preste especial atención a los cambios en dickerson dori y asegúrese de comunicarse con dickerson médico si: 
· No mejora dirk se esperaba. Dónde puede encontrar más información en inglés? Radha Adhikari a [email or web address]-debbie.info/. Amira Mijares D597 en la búsqueda para aprender más acerca de \"Mareos: Instrucciones de cuidado - [ Dizziness: Care Instructions ]. \" 
Revisado: 20 marzo, 2017 Versión del contenido: 11.3 © 7049-4668 Healthwise, Incorporated. Las instrucciones de cuidado fueron adaptadas bajo licencia por [employer] (which disclaims liability or warranty for this information). Si usted tiene [place] afección médica o sobre estas instrucciones, siempre pregunte a dickerson profesional de dori. Healthwise, Incorporated niega toda garantía o responsabilidad por dickerson uso de esta información. Aturdimiento o desmayo: Instrucciones de cuidado - [ [place] or Faintness: Care Instructions ] Instrucciones de cuidado El aturdimiento es la sensación de que está a punto de desmayarse o de \"perder el conocimiento\". No se siente dirk si usted o lo que le rodea se [place]. Es distinto del vértigo, que es la sensación de que usted o las cosas que le rodean dan vueltas o se inclinan. El aturdimiento suele desaparecer o mejorar cuando se acuesta. Si el aturdimiento empeora, esto puede conducir a un desvanecimiento. Es común sentirse aturdido de [employer]. Por lo general, el aturdimiento no se debe a un problema grave. A menudo es causado por larisa disminución de corta duración de la presión arterial y el flujo de christofer hacia la dusty que se produce al ponerse de pie con demasiada rapidez cuando está acostado o sentado. La atención de seguimiento es larisa parte clave de dickerson tratamiento y seguridad. Asegúrese de hacer y acudir a todas las citas, y llame a dickerson médico si está teniendo problemas.  También es larisa buena idea [employer] resultados de nora exámenes y mantener larisa lista de los medicamentos que waqar. Cómo puede cuidarse en el hogar? · Acuéstese briana 1 o 2 minutos cuando se sienta aturdido. Después de acostarse, siéntese lentamente y permanezca sentado de 1 a 2 minutos antes de ponerse de pie lentamente. · Evite los movimientos, las posturas o las actividades que le hayan producido aturdimiento en el pasado. · Descanse mucho, en especial si está resfriado o tiene gripe, ya que estas pueden causar aturdimiento. · Asegúrese de beber abundante líquido, en especial si tiene fiebre o si ha estado sudando. · No conduzca ni se ponga a sí mismo o ponga a otros en peligro mientras se sienta aturdido. Cuándo debe pedir ayuda? Llame al 911 en cualquier momento que considere que necesita atención de [place]. Por ejemplo, llame si: · Tiene síntomas de un ataque cerebral. Estos pueden incluir: 
¨ Entumecimiento, hormigueo, debilidad o parálisis repentinos en la carol, el brazo o la pierna, sobre todo si ocurre en un solo lado del cuerpo. ¨ Cambios súbitos en la vista. ¨ Problemas repentinos para hablar. ¨ Confusión súbita o dificultad repentina para comprender frases sencillas. ¨ Problemas repentinos para caminar o mantener el equilibrio. ¨ Un dolor de dusty intenso y repentino, distinto a los cristine de dusty anteriores. · Tiene síntomas de un ataque al corazón. Estos podrían incluir: ¨ Dolor o presión en el pecho, o larisa sensación extraña en el pecho. ¨ Sudoración. ¨ Falta de aire. ¨ Náuseas o vómito. ¨ Dolor, presión o larisa sensación extraña en la espalda, el elle, la mandíbula, la parte superior del abdomen, o en addi o ambos hombros o brazos. ¨ Aturdimiento o debilidad repentina. ¨ Latidos cardíacos rápidos o irregulares. Cuando llame al 911, es posible que le digan que mastique 1 aspirina para adultos o 2 a 4 aspirinas de dosis baja. Espere a la ambulancia. No trate de conducir usted mismo un automóvil. Preste especial atención a los cambios en dickerson dori y asegúrese de comunicarse con dickerson médico si: 
· El aturdimiento [place] o no mejora con los cuidados en el hogar. Dónde puede encontrar más información en inglés? Mike Tovar a [email or web address]-debbie.info/. Sera Maynard F340 en la búsqueda para aprender más acerca de \"Aturdimiento o desmayo: Instrucciones de cuidado - [ Lurlean Rump or Faintness: Care Instructions ]. \" 
Revisado: 20 marzo, 2017 Versión del contenido: 11.3 © 9906-1648 Healthwise, Incorporated. Las instrucciones de cuidado fueron adaptadas bajo licencia por Good Help Connections (which disclaims liability or warranty for this information). Si usted tiene [place] afección médica o sobre estas instrucciones, siempre pregunte a dickerson profesional de dori. Healthwise, Incorporated niega toda garantía o responsabilidad por dickerson uso de esta información. Discharge Orders [place] Announcement We are excited to announce that we are making your provider's discharge notes available to you in StreamLine Call. You will see these notes when they are completed and signed by the physician that discharged you from your recent hospital stay. If you have any questions or concerns about any information you see in StreamLine Call, please call the Health Information Department where you were seen or reach out to your Primary Care Provider for more information about your plan of care. Introducing [facility]! [facility] introduce portal paciente StreamLine Call . Ahora se puede acceder a partes de dickerson expediente médico, enviar por correo electrónico la oficina de dickerson médico y solicitar renovaciones de medicamentos en línea. En dickerson navegador de Internet , Celeste Bernardo a [email or web address] Cesar dirk en el usuario por [place]? Osmar cavazos aquí en la sesión Seferino Daughters. Verá la página de registro [place]. Ingrese dickerson código de [employer] larry y dirk aparece a continuación. Usted no tendrá que [employer] código después de charles completado el proceso de registro . Si usted no se inscribe antes de la fecha de caducidad , debe solicitar un nuevo código. · MyChart Código de acceso : [id number] Expires: 11/7/2017  1:26 AM 
 
Genesis Box los últimos cuatro dígitos de dickerson Número de Seguro Social ( xxxx ) y fecha de nacimiento ( dd / mm / aaaa ) dirk se indica y cesar clic en Enviar. Usted será llevado a la siguiente página de registro . Crear un ID MyChart . Esta será dickerson ID de inicio de sesión de MyChart y no puede ser [place] , por lo que pensar en larisa que es [place] y fácil de recordar . Crear larisa contraseña MyChart . Usted puede cambiar dickerson contraseña en cualquier momento . Ingrese dickerson Password Reset de preguntas y [place] . [place] se puede utilizar en un momento posterior si usted olvida dickerson contraseña. Introduzca dickerson dirección de correo electrónico . Tressa Fernández recibirá larisa notificación por correo electrónico cuando la nueva información está disponible en MyChart . Mary Alice cavazos en Registrarse. Ryan Juarez emma y descargar porciones de dickerson expediente médico. 
Cesar dirk en el enlace de descarga del menú Resumen para descargar larisa copia portátil de dickerson información médica . Si tiene [employer] , por favor visite la sección de preguntas frecuentes del sitio web MyChart . Recuerde, MyChart NO es que se utilizará para las necesidades urgentes. Para emergencias médicas , llame al 911 . Ahora disponible en dickerson iPhone y Android ! General Information Please provide this summary of care documentation to your next provider. Patient Signature:  ____________________________________________________________ Date:  ____________________________________________________________  
  
Siobhan Landsman Provider Signature:  ____________________________________________________________ Date:  ____________________________________________________________

## 2017-08-14 ENCOUNTER — APPOINTMENT (OUTPATIENT)
Dept: MRI IMAGING | Age: 32
End: 2017-08-14
Attending: HOSPITALIST
Payer: SELF-PAY

## 2017-08-14 ENCOUNTER — APPOINTMENT (OUTPATIENT)
Dept: MRI IMAGING | Age: 32
End: 2017-08-14
Attending: EMERGENCY MEDICINE
Payer: SELF-PAY

## 2017-08-14 PROBLEM — R42 DIZZINESS: Status: ACTIVE | Noted: 2017-08-14

## 2017-08-14 LAB — GLUCOSE BLD STRIP.AUTO-MCNC: 115 MG/DL (ref 70–110)

## 2017-08-14 PROCEDURE — 74011250636 HC RX REV CODE- 250/636: Performed by: EMERGENCY MEDICINE

## 2017-08-14 PROCEDURE — 74011250636 HC RX REV CODE- 250/636: Performed by: INTERNAL MEDICINE

## 2017-08-14 PROCEDURE — 74011250637 HC RX REV CODE- 250/637: Performed by: HOSPITALIST

## 2017-08-14 PROCEDURE — 70546 MR ANGIOGRAPH HEAD W/O&W/DYE: CPT

## 2017-08-14 PROCEDURE — 82962 GLUCOSE BLOOD TEST: CPT

## 2017-08-14 PROCEDURE — 74011250636 HC RX REV CODE- 250/636: Performed by: HOSPITALIST

## 2017-08-14 PROCEDURE — 70544 MR ANGIOGRAPHY HEAD W/O DYE: CPT

## 2017-08-14 PROCEDURE — 70549 MR ANGIOGRAPH NECK W/O&W/DYE: CPT

## 2017-08-14 PROCEDURE — A9585 GADOBUTROL INJECTION: HCPCS | Performed by: INTERNAL MEDICINE

## 2017-08-14 PROCEDURE — 77030021566 MRA NECK W WO CONT

## 2017-08-14 PROCEDURE — 70551 MRI BRAIN STEM W/O DYE: CPT

## 2017-08-14 PROCEDURE — 99218 HC RM OBSERVATION: CPT

## 2017-08-14 PROCEDURE — 77030020263 HC SOL INJ SOD CL0.9% LFCR 1000ML

## 2017-08-14 PROCEDURE — A9585 GADOBUTROL INJECTION: HCPCS | Performed by: EMERGENCY MEDICINE

## 2017-08-14 RX ORDER — ACETAMINOPHEN 325 MG/1
650 TABLET ORAL
Status: DISCONTINUED | OUTPATIENT
Start: 2017-08-14 | End: 2017-08-15 | Stop reason: HOSPADM

## 2017-08-14 RX ORDER — MAG HYDROX/ALUMINUM HYD/SIMETH 200-200-20
30 SUSPENSION, ORAL (FINAL DOSE FORM) ORAL
Status: DISCONTINUED | OUTPATIENT
Start: 2017-08-14 | End: 2017-08-15 | Stop reason: HOSPADM

## 2017-08-14 RX ORDER — SODIUM CHLORIDE 9 MG/ML
100 INJECTION, SOLUTION INTRAVENOUS CONTINUOUS
Status: DISCONTINUED | OUTPATIENT
Start: 2017-08-14 | End: 2017-08-15 | Stop reason: HOSPADM

## 2017-08-14 RX ORDER — MECLIZINE HCL 12.5 MG 12.5 MG/1
25 TABLET ORAL
Status: DISCONTINUED | OUTPATIENT
Start: 2017-08-14 | End: 2017-08-15 | Stop reason: HOSPADM

## 2017-08-14 RX ORDER — PANTOPRAZOLE SODIUM 40 MG/1
40 TABLET, DELAYED RELEASE ORAL
Status: DISCONTINUED | OUTPATIENT
Start: 2017-08-14 | End: 2017-08-15 | Stop reason: HOSPADM

## 2017-08-14 RX ORDER — ONDANSETRON 4 MG/1
4 TABLET, ORALLY DISINTEGRATING ORAL
Status: DISCONTINUED | OUTPATIENT
Start: 2017-08-14 | End: 2017-08-15 | Stop reason: HOSPADM

## 2017-08-14 RX ORDER — ENOXAPARIN SODIUM 100 MG/ML
40 INJECTION SUBCUTANEOUS EVERY 24 HOURS
Status: DISCONTINUED | OUTPATIENT
Start: 2017-08-14 | End: 2017-08-15 | Stop reason: HOSPADM

## 2017-08-14 RX ADMIN — PANTOPRAZOLE SODIUM 40 MG: 40 TABLET, DELAYED RELEASE ORAL at 09:58

## 2017-08-14 RX ADMIN — GADOBUTROL 7.5 ML: 604.72 INJECTION INTRAVENOUS at 01:34

## 2017-08-14 RX ADMIN — GADOBUTROL 15 ML: 604.72 INJECTION INTRAVENOUS at 15:29

## 2017-08-14 RX ADMIN — SODIUM CHLORIDE 100 ML/HR: 900 INJECTION, SOLUTION INTRAVENOUS at 06:21

## 2017-08-14 RX ADMIN — SODIUM CHLORIDE 1000 ML: 900 INJECTION, SOLUTION INTRAVENOUS at 13:50

## 2017-08-14 RX ADMIN — SODIUM CHLORIDE 100 ML/HR: 900 INJECTION, SOLUTION INTRAVENOUS at 17:17

## 2017-08-14 RX ADMIN — ENOXAPARIN SODIUM 40 MG: 40 INJECTION SUBCUTANEOUS at 09:57

## 2017-08-14 NOTE — ED NOTES
Second page placed to hospitalist, no return phone call  Hypotension persists, now low 20'P systolic sitting up. No longer 88 systolic but still dropping from high 90's when sat up initially. Continuous fluids are still infusing.

## 2017-08-14 NOTE — H&P
[facility]  Hospitalist Division      History & Physical    Patient: Bianca Chester MRN: [id number]  CSN: [id number]    YOB: 1985  Age: 27 y.o. Sex: female    DOA: 8/13/2017 LOS:  LOS: 0 days        DOA: 8/13/2017        Assessment/Plan     Active Problems:    Dizziness (8/14/2017)        Plan:  1. Dizziness - likely BPV -- will start Meclizine - Tele Neuro consulted , recommended MRI , MRV - negative but now would like to do MRA head & neck  -- will do Obs admit   2. Headache - Tylenol prn   DVT Px - Lovenox   Full code         HPI:     Bianca Chester is a 27 y.o. female who is being admitted to the hosp for dizziness. Pt is [place] speaking only but her  is by bedside who mentions that she developed dizziness yesterday & it got worse so she came to the ER for further eval. Dizziness assoc with nausea , no vomiting , no fever , no neck rigidity , no confusion. She feels like the room is spinning. She has a remote h/o ? Cerebral occlusion - sounds more like a sinus thrombosis. Tele neuro consulted - initially recommended MRI , MRV - both of which are negative but per their note would also like MRA head & neck - - pt being placed on Obs for further eval.     History reviewed. No pertinent past medical history. History reviewed. No pertinent surgical history. History reviewed. No pertinent family history. Social History     Social History    Marital status:      Spouse name: N/A    Number of children: N/A    Years of education: N/A     Social History Main Topics    Smoking status: Never Smoker    Smokeless tobacco: Never Used    Alcohol use No    Drug use: No    Sexual activity: Not Asked     Other Topics Concern    None     Social History Narrative       Prior to Admission medications    Medication Sig Start Date End Date Taking?  Authorizing Provider   omeprazole (PRILOSEC) 20 mg capsule Take 1 Cap by mouth daily for 14 days. 8/9/17 8/23/17  Alverto You MD   ondansetron (ZOFRAN ODT) 4 mg disintegrating tablet Take 1 Tab by mouth every eight (8) hours as needed for Nausea. 8/9/17   Alverto You MD   alum-mag hydroxide-simeth (MYLANTA) 200-200-20 mg/5 mL susp Take 30 mL by mouth four (4) times daily as needed. 8/9/17   Alverto You MD   acetaminophen (TYLENOL EXTRA STRENGTH) 500 mg tablet Take 2 Tabs by mouth every six (6) hours as needed for Pain. 8/9/17   Alverto You MD       No Known Allergies    Review of Systems  A comprehensive review of systems was negative except for that written in the History of Present Illness. Physical Exam:      Visit Vitals    /45    Pulse 79    Temp 98.4 °F (36.9 °C)    Resp 16    Ht 5' 3\" (1.6 m)    Wt 75 kg (165 lb 5.5 oz)    SpO2 97%    BMI 29.29 kg/m2       Physical Exam:    Gen: In general, this is a well nourished female in no acute distress  HEENT: Sclerae nonicteric. Oral mucous membranes moist. Dentition normal  Neck: Supple with midline trachea. CV: RRR without murmur or rub appreciated. Resp:Respirations are unlabored without use of accessory muscles. Lung fields bilaterally without wheezes or rhonchi. Abd: Soft, nontender, nondistended. Extrem: Extremities are warm, without cyanosis or clubbing. No pitting pretibial edema. Palpable distal pulses X 4.   Skin: Warm, no visible rashes. Neuro: Patient is alert, oriented, and cooperative. No obvious focal defects. Moves all 4 extremities.     Labs Reviewed:    Recent Results (from the past 24 hour(s))   CBC WITH AUTOMATED DIFF    Collection Time: 08/13/17  9:30 PM   Result Value Ref Range    WBC 8.3 4.6 - 13.2 K/uL    RBC 4.47 4.20 - 5.30 M/uL    HGB 13.8 12.0 - 16.0 g/dL    HCT 40.7 35.0 - 45.0 %    MCV 91.1 74.0 - 97.0 FL    MCH 30.9 24.0 - 34.0 PG    MCHC 33.9 31.0 - 37.0 g/dL    RDW 13.3 11.6 - 14.5 %    PLATELET 108 803 - 882 K/uL    MPV 11.4 9.2 - 11.8 FL    NEUTROPHILS 53 40 - 73 % LYMPHOCYTES 30 21 - 52 %    MONOCYTES 11 (H) 3 - 10 %    EOSINOPHILS 6 (H) 0 - 5 %    BASOPHILS 0 0 - 2 %    ABS. NEUTROPHILS 4.4 1.8 - 8.0 K/UL    ABS. LYMPHOCYTES 2.5 0.9 - 3.6 K/UL    ABS. MONOCYTES 0.9 0.05 - 1.2 K/UL    ABS. EOSINOPHILS 0.5 (H) 0.0 - 0.4 K/UL    ABS.  BASOPHILS 0.0 0.0 - 0.06 K/UL    DF AUTOMATED     METABOLIC PANEL, BASIC    Collection Time: 08/13/17  9:30 PM   Result Value Ref Range    Sodium 143 136 - 145 mmol/L    Potassium 3.9 3.5 - 5.5 mmol/L    Chloride 109 (H) 100 - 108 mmol/L    CO2 24 21 - 32 mmol/L    Anion gap 10 3.0 - 18 mmol/L    Glucose 86 74 - 99 mg/dL    BUN 13 7.0 - 18 MG/DL    Creatinine 0.63 0.6 - 1.3 MG/DL    BUN/Creatinine ratio 21 (H) 12 - 20      GFR est AA >60 >60 ml/min/1.73m2    GFR est non-AA >60 >60 ml/min/1.73m2    Calcium 8.7 8.5 - 10.1 MG/DL   MAGNESIUM    Collection Time: 08/13/17  9:30 PM   Result Value Ref Range    Magnesium 2.2 1.6 - 2.6 mg/dL   URINALYSIS W/ RFLX MICROSCOPIC    Collection Time: 08/13/17 10:55 PM   Result Value Ref Range    Color YELLOW      Appearance CLEAR      Specific gravity 1.005 1.005 - 1.030      pH (UA) 7.0 5.0 - 8.0      Protein NEGATIVE  NEG mg/dL    Glucose NEGATIVE  NEG mg/dL    Ketone NEGATIVE  NEG mg/dL    Bilirubin NEGATIVE  NEG      Blood NEGATIVE  NEG      Urobilinogen 0.2 0.2 - 1.0 EU/dL    Nitrites NEGATIVE  NEG      Leukocyte Esterase LARGE (A) NEG     HCG URINE, QL    Collection Time: 08/13/17 10:55 PM   Result Value Ref Range    HCG urine, Ql. NEGATIVE  NEG     URINE MICROSCOPIC ONLY    Collection Time: 08/13/17 10:55 PM   Result Value Ref Range    WBC 11 to 20 0 - 4 /hpf    RBC 0 to 3 0 - 5 /hpf    Epithelial cells 2+ 0 - 5 /lpf    Bacteria 1+ (A) NEG /hpf       Imaging Reviewed:    Reviewed           Horacio Maza MD  8/14/2017, 5:30 AM

## 2017-08-14 NOTE — ED NOTES
Paged hospitalist for continued hypotension, 88 systolic X 2 BP  Pt sat up in bed, legs uncrossed, systolic now 99  Continuous fluids continue to infuse

## 2017-08-14 NOTE — ED PROVIDER NOTES
HPI Comments: 28 y/o female with PMH of ?cerebral occlusion post partum presents with c/o dizziness and nausea. Present for about 1 week, was seen a few days ago for abd pain and nausea. No c/o things spinning around here and \"feeling drunk\". Denies HA. Also c/o feeling off balance. Denies numbness or tingling. Admits to some generalized weakness. Tried zofran without improvement. No prior episodes. Reports sxs worsen with head movement and worse when standing. No other complaints. Patient is a 27 y.o. female presenting with dizziness. Dizziness          History reviewed. No pertinent past medical history. History reviewed. No pertinent surgical history. History reviewed. No pertinent family history. Social History     Social History    Marital status:      Spouse name: N/A    Number of children: N/A    Years of education: N/A     Occupational History    Not on file. Social History Main Topics    Smoking status: Never Smoker    Smokeless tobacco: Never Used    Alcohol use No    Drug use: No    Sexual activity: Not on file     Other Topics Concern    Not on file     Social History Narrative         ALLERGIES: Review of patient's allergies indicates no known allergies. Review of Systems   Neurological: Positive for dizziness. Vitals:    08/13/17 2359 08/14/17 0001 08/14/17 0206 08/14/17 0209   BP: 101/61 110/53 (!) 123/97    Pulse:       Resp:       Temp:       SpO2: 99% 98%  97%   Weight:       Height:                Physical Exam   Constitutional: She is oriented to person, place, and time. She appears well-developed and well-nourished. HENT:   Head: Normocephalic and atraumatic. Eyes: EOM are normal. Pupils are equal, round, and reactive to light. Neck: Neck supple. No JVD present. Cardiovascular: Normal rate and regular rhythm. Pulmonary/Chest: Effort normal and breath sounds normal. No respiratory distress. Abdominal: Soft. She exhibits no distension. There is no tenderness. There is no rebound and no guarding. Musculoskeletal: She exhibits no edema. Neurological: She is alert and oriented to person, place, and time. No cranial nerve deficit. Coordination normal.   Strength 5/5 x4 extremities  Gross sensation intact x4 extremities   Skin: Skin is warm and dry. No erythema. Psychiatric: Judgment normal.        MDM  Number of Diagnoses or Management Options  Diagnosis management comments: Pt with normal neuro exam    However reported hx of \"brain clot\" so will ct head to eval, as pt poor historian, no prior records here or [facility]. Amount and/or Complexity of Data Reviewed  Clinical lab tests: ordered and reviewed  Tests in the radiology section of CPT®: ordered and reviewed      ED Course       Procedures    Despite me asking multiple times during exam and pt denying HA, upon me leaving room and RN speaking with pt she is requesting medication for HA. Medications ordered:   Medications   meclizine (ANTIVERT) tablet 50 mg (50 mg Oral Given 8/13/17 2211)   0.9% sodium chloride infusion 1,000 mL (0 mL IntraVENous IV Completed 8/13/17 2255)   acetaminophen (TYLENOL) tablet 650 mg (650 mg Oral Given 8/13/17 2211)   gadobutrol (Gadavist) contrast solution 7.5 mL (7.5 mL IntraVENous Given 8/14/17 0134)         Lab findings:  Labs Reviewed   CBC WITH AUTOMATED DIFF - Abnormal; Notable for the following:        Result Value    MONOCYTES 11 (*)     EOSINOPHILS 6 (*)     ABS.  EOSINOPHILS 0.5 (*)     All other components within normal limits   METABOLIC PANEL, BASIC - Abnormal; Notable for the following:     Chloride 109 (*)     BUN/Creatinine ratio 21 (*)     All other components within normal limits   URINALYSIS W/ RFLX MICROSCOPIC - Abnormal; Notable for the following:     Leukocyte Esterase LARGE (*)     All other components within normal limits   URINE MICROSCOPIC ONLY - Abnormal; Notable for the following:     Bacteria 1+ (*)     All other components within normal limits   MAGNESIUM   HCG URINE, QL       X-Ray, CT or other radiology findings or impressions:  CT HEAD WO CONT    (Results Pending)   MRI BRAIN WO CONT    (Results Pending)   MRV BRAIN W WO CONT    (Results Pending)   1. No acute intracranial abnormality  2. Left cerebellar calcifications  3. High left cerebral hypodensity at the parietal lobe possibly representing prior infarct. Progress notes, Consult notes or additional Procedure notes:   Due to abnormal ct, consult teleneuurology. 11:35 PM. Consult with Dr. Kaylin Jaimes, Teleneurology, she will evaluate pt.   0181 AM. Dr. Kaylin Jaimes recommends MRI, MRV. Following studies, obtained consult report from Dr. Kaylin Jaimes, also wants MRA head/neck and recommends admission. 342 AM. Consult with Dr. Alisa Mckeon, hospitalist, accepts pt for admission.        Dispo:  Patient was admitted

## 2017-08-14 NOTE — ED NOTES
Pt placed on hospital bed for comfort  Any needs addressed at this time  environment cleaned and decluttered

## 2017-08-14 NOTE — ED NOTES
Third page to hospitalist, no return phone call. Hypotension persists, pt denies dizziness and nausea. Able to ambulate to bathroom, but writer concerned about map and systolic  Writer requests bolus from ER provider.   Order placed by Dr Kinga Weber and administered per written order, please see [facility]

## 2017-08-14 NOTE — PROGRESS NOTES
Daily Progress Note: 2017 4:43 PM   Admit Date: 2017    Patient seen in follow up for multiple medical problems as listed below:  Patient Active Problem List   Diagnosis Code    Dizziness R42       Assesment     Symptomatic hypotension - continue IVF, TTEx1, monitor for infection, etiology unclear  Headache - tylenol  Dizzyness - likely symptomatic hypotnesion. Full neuro workup with MRI head, MRA head/neck pending. DVT Protocol Active: yes  Code Status:  Full Code     Disposition:home 1-2 days    Subjective:     CC: Dizziness    Interval History: still light headed, BP down to 80/40's in ER responded to IVF, this is likely the cause of her symptoms. ROS: 11 point ROS negative     Objective:     Visit Vitals    /74 (BP 1 Location: Right arm, BP Patient Position: At rest)    Pulse 80    Temp 98.3 °F (36.8 °C)    Resp 18    Ht 5' 3\" (1.6 m)    Wt 75 kg (165 lb 5.5 oz)    SpO2 97%    BMI 29.29 kg/m2       Temp (24hrs), Av.4 °F (36.9 °C), Min:98.3 °F (36.8 °C), Max:98.4 °F (36.9 °C)      No intake or output data in the 24 hours ending 17 1643    Gen: AOx3, NAD  HEENT:  MALKA, EOMI. Neck: No Bruits/JVD   Lungs:   CTAB. Good respiratory effort  Heart:   RR S1 S2 without M/R/G  Abdomen: ND,NT, BSX4,   Extremities:   No LE edema. No cyanosis.   Skin:  no jaundice/lesions      Data Review:     Meds/Labs/Tests reviewed    Current Shift:     Last three shifts:     Recent Labs      17   WBC  8.3   RBC  4.47   HGB  13.8   HCT  40.7   PLT  166   GRANS  53   LYMPH  30   EOS  6*       Recent Labs      170   BUN  13   CREA  0.63   CA  8.7   K  3.9   NA  143   CL  109*   CO2  24   GLU  86        Lab Results   Component Value Date/Time    Glucose 86 2017 09:30 PM    Glucose 101 2017 12:18 AM          Care coordination with Nursing/Consultants/staff: 10  Prior history, labs, and charting reviewed: 25    Procedures/Imaging:  CT head  MRI head  MRA head/neck  TTE    Total time spent with chart review, patient examination/education, discussion with staff on case,documentation and medication management / adjustment  :  30 Minutes      Dr Kal Graf  [phone]

## 2017-08-15 VITALS
SYSTOLIC BLOOD PRESSURE: 104 MMHG | DIASTOLIC BLOOD PRESSURE: 56 MMHG | RESPIRATION RATE: 20 BRPM | HEART RATE: 73 BPM | TEMPERATURE: 98.2 F | OXYGEN SATURATION: 95 % | BODY MASS INDEX: 29.23 KG/M2 | WEIGHT: 165 LBS | HEIGHT: 63 IN

## 2017-08-15 PROBLEM — I95.9 HYPOTENSION: Status: ACTIVE | Noted: 2017-08-15

## 2017-08-15 LAB
ALBUMIN SERPL BCP-MCNC: 2.8 G/DL (ref 3.4–5)
ALBUMIN/GLOB SERPL: 0.9 {RATIO} (ref 0.8–1.7)
ALP SERPL-CCNC: 98 U/L (ref 45–117)
ALT SERPL-CCNC: 30 U/L (ref 13–56)
ANION GAP BLD CALC-SCNC: 8 MMOL/L (ref 3–18)
AST SERPL W P-5'-P-CCNC: 19 U/L (ref 15–37)
BILIRUB SERPL-MCNC: 0.3 MG/DL (ref 0.2–1)
BUN SERPL-MCNC: 8 MG/DL (ref 7–18)
BUN/CREAT SERPL: 15 (ref 12–20)
CALCIUM SERPL-MCNC: 7.9 MG/DL (ref 8.5–10.1)
CHLORIDE SERPL-SCNC: 110 MMOL/L (ref 100–108)
CHOLEST SERPL-MCNC: 154 MG/DL
CO2 SERPL-SCNC: 24 MMOL/L (ref 21–32)
CREAT SERPL-MCNC: 0.53 MG/DL (ref 0.6–1.3)
ERYTHROCYTE [DISTWIDTH] IN BLOOD BY AUTOMATED COUNT: 13.3 % (ref 11.6–14.5)
GLOBULIN SER CALC-MCNC: 3.1 G/DL (ref 2–4)
GLUCOSE SERPL-MCNC: 106 MG/DL (ref 74–99)
HCT VFR BLD AUTO: 38 % (ref 35–45)
HDLC SERPL-MCNC: 31 MG/DL (ref 40–60)
HDLC SERPL: 5 {RATIO} (ref 0–5)
HGB BLD-MCNC: 12.7 G/DL (ref 12–16)
INR PPP: 1 (ref 0.8–1.2)
LDLC SERPL CALC-MCNC: 84 MG/DL (ref 0–100)
LIPID PROFILE,FLP: ABNORMAL
MCH RBC QN AUTO: 30.6 PG (ref 24–34)
MCHC RBC AUTO-ENTMCNC: 33.4 G/DL (ref 31–37)
MCV RBC AUTO: 91.6 FL (ref 74–97)
PLATELET # BLD AUTO: 170 K/UL (ref 135–420)
PMV BLD AUTO: 11.3 FL (ref 9.2–11.8)
POTASSIUM SERPL-SCNC: 4 MMOL/L (ref 3.5–5.5)
PROT SERPL-MCNC: 5.9 G/DL (ref 6.4–8.2)
PROTHROMBIN TIME: 13.2 SEC (ref 11.5–15.2)
RBC # BLD AUTO: 4.15 M/UL (ref 4.2–5.3)
SODIUM SERPL-SCNC: 142 MMOL/L (ref 136–145)
TRIGL SERPL-MCNC: 195 MG/DL (ref ?–150)
VLDLC SERPL CALC-MCNC: 39 MG/DL
WBC # BLD AUTO: 6 K/UL (ref 4.6–13.2)

## 2017-08-15 PROCEDURE — 80053 COMPREHEN METABOLIC PANEL: CPT | Performed by: HOSPITALIST

## 2017-08-15 PROCEDURE — 36415 COLL VENOUS BLD VENIPUNCTURE: CPT | Performed by: HOSPITALIST

## 2017-08-15 PROCEDURE — 99218 HC RM OBSERVATION: CPT

## 2017-08-15 PROCEDURE — 74011250637 HC RX REV CODE- 250/637: Performed by: HOSPITALIST

## 2017-08-15 PROCEDURE — 77030020263 HC SOL INJ SOD CL0.9% LFCR 1000ML

## 2017-08-15 PROCEDURE — 85610 PROTHROMBIN TIME: CPT | Performed by: HOSPITALIST

## 2017-08-15 PROCEDURE — 85027 COMPLETE CBC AUTOMATED: CPT | Performed by: HOSPITALIST

## 2017-08-15 PROCEDURE — 74011250636 HC RX REV CODE- 250/636: Performed by: HOSPITALIST

## 2017-08-15 PROCEDURE — 80061 LIPID PANEL: CPT | Performed by: HOSPITALIST

## 2017-08-15 PROCEDURE — 93306 TTE W/DOPPLER COMPLETE: CPT

## 2017-08-15 RX ADMIN — SODIUM CHLORIDE 100 ML/HR: 900 INJECTION, SOLUTION INTRAVENOUS at 03:19

## 2017-08-15 RX ADMIN — ENOXAPARIN SODIUM 40 MG: 40 INJECTION SUBCUTANEOUS at 09:09

## 2017-08-15 RX ADMIN — PANTOPRAZOLE SODIUM 40 MG: 40 TABLET, DELAYED RELEASE ORAL at 09:09

## 2017-08-15 NOTE — DISCHARGE SUMMARY
2 [facility]  Hospitalist Division    Discharge Summary      Patient: Murtaza Burris MRN: [id number]  CSN: [id number]    YOB: 1985  Age: 27 y.o. Sex: female    DOA: 8/13/2017 LOS:  LOS: 0 days   Discharge Date: 08/15/17     PCP:  Marcos Ac NP    Chief Complaint:    Chief Complaint   Patient presents with    Dizziness     Hypotension    Admission Diagnosis:   Hospital Problems as of 8/15/2017  Never Reviewed          Codes Class Noted - Resolved POA    * (Principal)Hypotension ICD-10-CM: I95.9  ICD-9-CM: 458.9  8/15/2017 - Present Unknown        Dizziness ICD-10-CM: R42  ICD-9-CM: 780.4  8/14/2017 - Present Unknown              Discharge Diagnoses:    Symptomatic hypotension - continue IVF, TTEx1, monitor for infection, etiology unclear  Headache - tylenol  Dizzyness - likely symptomatic hypotnesion. Vs BPPV  Full neuro workup with MRI head, MRA head/neck pending. Hospital Course:   27 y.o. female was admitted to the hosp for dizziness. Pt is [place] speaking only. Her  states she developed dizziness 1 day prior and it got worse, so she came to the ER for further eval. Dizziness assoc with nausea , no vomiting , no fever , no neck rigidity , no confusion. She feels like the room is spinning. She had a CT head, MRI head, MRA head and neck that were all essentially normal. On her first hospital day she was noted to have BP 88/42 at one time however this responded to IV fluids.   TTE 8/15 wnl EF55-60%    Significant Diagnostic Studies:  CT head  MRI head  MRA head/neck  TTE    Consults:  Treatment Team: Attending Provider: Miranda Watt DO; Consulting Provider: Sha Goss MD; Consulting Provider: Doanto Ram MD; Care Manager: Belinda Jeronimo; Utilization Review: Oscar Thomas RN    Operative Procedures:  N/A    Disposition:  Home    Diet:  High salt    Discharge Condition:   Good    Discharge Medications:    Current Discharge Medication List      CONTINUE these medications which have NOT CHANGED    Details   omeprazole (PRILOSEC) 20 mg capsule Take 1 Cap by mouth daily for 14 days. Qty: 14 Cap, Refills: 1      ondansetron (ZOFRAN ODT) 4 mg disintegrating tablet Take 1 Tab by mouth every eight (8) hours as needed for Nausea. Qty: 8 Tab, Refills: 0      alum-mag hydroxide-simeth (MYLANTA) 200-200-20 mg/5 mL susp Take 30 mL by mouth four (4) times daily as needed. Qty: 354 mL, Refills: 0      acetaminophen (TYLENOL EXTRA STRENGTH) 500 mg tablet Take 2 Tabs by mouth every six (6) hours as needed for Pain. Qty: 30 Tab, Refills: 0               Follow-Up And Discharge Instructions:    Follow-up Information     Follow up With Details Comments Contact Info    Find a primary care doctor       [facility] Schedule an appointment as soon as possible for a visit  [address] [place] [address]    Maryann Enciso NP On 8/23/2017 at 1:00 pm [address] [place] [address]  [phone]              Wound Care:   N/A      Dr Sierra Elmore        Time Spent:  35min    Cc: Maryann Enciso NP

## 2017-08-15 NOTE — PROGRESS NOTES
conducted an initial consultation and Spiritual Assessment for Beto Palacios, who is a 27 y.o.,female. Patients Primary Language is: Uzbek. According to the patients EMR Sikhism Affiliation is: No preference. The reason the Patient came to the hospital is:   Patient Active Problem List    Diagnosis Date Noted    Dizziness 08/14/2017        The  provided the following Interventions:  Initiated a relationship of care and support. Explored issues of bashir, belief, spirituality and Zoroastrianism/ritual needs while hospitalized. Listened empathically. Provided information about Spiritual Care Services. Offered prayer and assurance of continued prayers on patient's behalf. Chart reviewed. The following outcomes were achieved:  Patient shared limited information about both their medical narrative and spiritual journey/beliefs. Patient processed feeling about current hospitalization. Patient expressed gratitude for 's visit. Assessment:  Patient does not have any Zoroastrianism/cultural needs that will affect patients preferences in health care. There are no further spiritual or Zoroastrianism issues which require intervention at this time. Plan:  Chaplains will continue to follow and will provide pastoral care on an as needed/requested basis.  recommends bedside caregivers page  on duty if patient shows signs of acute spiritual or emotional distress. Fer Novak M.Div.   [facility]  [phone]

## 2017-08-15 NOTE — DISCHARGE INSTRUCTIONS
CHECK YOUR BLOOD PRESSURE DAILY, CHECK WHEN LIGHT HEADED    EAT A HIGH SALT DIET        DISCHARGE SUMMARY from Nurse    The following personal items are in your possession at time of discharge:        PATIENT INSTRUCTIONS:    After general anesthesia or intravenous sedation, for 24 hours or while taking prescription Narcotics:  · Limit your activities  · Do not drive and operate hazardous machinery  · Do not make important personal or business decisions  · Do  not drink alcoholic beverages  · If you have not urinated within 8 hours after discharge, please contact your surgeon on call. Report the following to your surgeon:  · Excessive pain, swelling, redness or odor of or around the surgical area  · Temperature over 100.5  · Nausea and vomiting lasting longer than 4 hours or if unable to take medications  · Any signs of decreased circulation or nerve impairment to extremity: change in color, persistent  numbness, tingling, coldness or increase pain  · Any questions        What to do at Home:  Recommended activity: Activity as tolerated,     If you experience any of the following symptoms dizziness or lightheadedness, please follow up with primary care physician. *  Please give a list of your current medications to your Primary Care Provider. *  Please update this list whenever your medications are discontinued, doses are      changed, or new medications (including over-the-counter products) are added. *  Please carry medication information at all times in case of emergency situations. These are general instructions for a healthy lifestyle:    No smoking/ No tobacco products/ Avoid exposure to second hand smoke    Surgeon General's Warning:  Quitting smoking now greatly reduces serious risk to your health.     Obesity, smoking, and sedentary lifestyle greatly increases your risk for illness    A healthy diet, regular physical exercise & weight monitoring are important for maintaining a healthy lifestyle    You may be retaining fluid if you have a history of heart failure or if you experience any of the following symptoms:  Weight gain of 3 pounds or more overnight or 5 pounds in a week, increased swelling in our hands or feet or shortness of breath while lying flat in bed. Please call your doctor as soon as you notice any of these symptoms; do not wait until your next office visit. Recognize signs and symptoms of STROKE:    F-face looks uneven    A-arms unable to move or move unevenly    S-speech slurred or non-existent    T-time-call 911 as soon as signs and symptoms begin-DO NOT go       Back to bed or wait to see if you get better-TIME IS BRAIN. Warning Signs of HEART ATTACK     Call 911 if you have these symptoms:   Chest discomfort. Most heart attacks involve discomfort in the center of the chest that lasts more than a few minutes, or that goes away and comes back. It can feel like uncomfortable pressure, squeezing, fullness, or pain.  Discomfort in other areas of the upper body. Symptoms can include pain or discomfort in one or both arms, the back, neck, jaw, or stomach.  Shortness of breath with or without chest discomfort.  Other signs may include breaking out in a cold sweat, nausea, or lightheadedness. Don't wait more than five minutes to call 911 - MINUTES MATTER! Fast action can save your life. Calling 911 is almost always the fastest way to get lifesaving treatment. Emergency Medical Services staff can begin treatment when they arrive -- up to an hour sooner than if someone gets to the hospital by car. The discharge information has been reviewed with the patient. The patient verbalized understanding. Discharge medications reviewed with the patient and appropriate educational materials and side effects teaching were provided. Presión arterial baja:  Instrucciones de cuidado - [ Low Blood Pressure: Care Instructions ]  Instrucciones de cuidado  La presión arterial es la medida de la fuerza que ejerce la christofer contra las salas de los vasos sanguíneos briana y después de cada latido del corazón. La presión arterial baja (hipotensión) significa que la presión arterial está mucho más baja de lo normal. Algunas personas, especialmente las mujeres jóvenes y [place], podrían tener larisa presión ligeramente baja sin síntomas. Sin embargo, en [facility] la presión arterial baja puede causar síntomas dirk mareo o aturdimiento. Cuando la presión arterial está demasiado baja, el corazón, el cerebro y otros órganos no reciben suficiente christofer. La presión arterial baja puede tener muchas causas, dirk problemas cardíacos y algunos medicamentos. La diabetes no controlada, larisa reacción alérgica grave o larisa infección pueden hacer que la presión arterial baje. Otra causa es la deshidratación, es decir, cuando el cuerpo pierde demasiado líquido. El tratamiento para la presión arterial baja depende de la causa. La atención de seguimiento es larisa parte clave de dickerson tratamiento y seguridad. Asegúrese de hacer y acudir a todas las citas, y llame a dickerson médico si está teniendo problemas. También es larisa buena idea saber los resultados de nora exámenes y mantener larisa lista de los medicamentos que waqar. ¿Cómo puede cuidarse en el hogar? · Tara abundantes líquidos, los suficientes para que dickerson orina sea de color amarillo steven o transparente dirk el agua. Si tiene [employer], el corazón o el hígado y tiene que Frandy's líquidos, hable con dickerson médico antes de aumentar dickerson consumo. · Sea curly con los medicamentos. Llame a dickerson médico si tootie estar teniendo un problema con dickerson medicamento. Recibirá [employer] medicamentos específicos recetados por dickerson médico.  · Póngase de pie o levántese de la cama muy lentamente para que el cuerpo se ajuste. · Descanse lo suficiente. · No fume. Fumar aumenta el riesgo de tener un ataque al corazón.  Si necesita ayuda para dejar de fumar, hable con dickerson médico sobre programas y medicamentos para dejar de fumar. Estos pueden aumentar nora probabilidades de dejar el hábito para siempre. · Limite el alcohol a 2 bebidas al día si es hombre, y 1 bebida al día si es tima. El alcohol podría interferir en la acción de dickerson medicamento. Además, el alcohol puede hacer que la presión arterial baja empeore ya que hace que el cuerpo pierda agua. ¿Cuándo debe pedir ayuda? Llame al 911 en cualquier momento que considere que necesita atención de [place]. Por ejemplo, llame si:  · Tiene síntomas de un ataque al corazón. Estos pueden incluir:  ¨ Dolor o presión en el pecho, o larisa sensación extraña en el pecho. ¨ Sudoración. ¨ Falta de aire. ¨ Náuseas o vómito. ¨ Dolor, presión o larisa sensación extraña en la espalda, el elle, la mandíbula, la parte superior del abdomen o en addi o ambos hombros o brazos. ¨ Aturdimiento o debilidad repentina. ¨ Latidos del corazón rápidos o irregulares. Después de llamar al 911, es posible que el operador le diga que mastique 1 aspirina para adultos o de 2 a 4 aspirinas de dosis baja. Espere a larisa ambulancia. No intente conducir usted mismo. · Tiene síntomas de un ataque cerebral. Estos pueden incluir:  ¨ Entumecimiento, hormigueo, debilidad o parálisis repentinos en la carol, el brazo o la pierna, sobre todo si ocurre en un solo lado del cuerpo. ¨ Cambios súbitos en la vista. ¨ Problemas repentinos para hablar. ¨ Confusión súbita o dificultad repentina para comprender frases sencillas. ¨ Problemas repentinos para caminar o mantener el equilibrio. ¨ Un dolor de dusty intenso y repentino, distinto a los cristine de dusty anteriores. · Se desmayó (perdió el conocimiento). Llame a dickerson médico ahora mismo o busque atención médica inmediata si:  · Siente mareo o aturdimiento, o siente que se va a desmayar. · Tiene señales de necesitar más líquidos.  Tiene los ojos hundidos y la boca seca, y [place] solo poca cantidad de color oscuro. · No puede retener líquidos en el estómago. Preste especial atención a los cambios en dickerson dori y asegúrese de comunicarse con dickerson médico si:  · No mejora dirk se esperaba. ¿Dónde puede encontrar más información en inglés? Rangel cool http://jules-debbie.info/. Escriba C304 en la búsqueda para aprender más acerca de \"Presión arterial baja: Instrucciones de cuidado - [ Low Blood Pressure: Care Instructions ]. \"  Revisado: 21 christine, 2016  Versión del contenido: 11.3  © 7782-7028 Healthwise, Incorporated. Las instrucciones de cuidado fueron adaptadas bajo licencia por [employer] (which disclaims liability or warranty for this information). Si usted tiene [place] afección médica o sobre estas instrucciones, siempre pregunte a dickerson profesional de dori. Healthwise, Incorporated niega toda garantía o responsabilidad por dickerson uso de esta información. RECOMMEND HIGH SALT DIET     Mareos: Instrucciones de cuidado - [ Dizziness: Care Instructions ]  Instrucciones de cuidado  Los mareos son [place] [employer] sensación de inestabilidad o confusión en la dusty. Son distintos al vértigo, larisa sensación de que la habitación gira o de que usted se mueve o . También es distinto del aturdimiento, que es la sensación de que está a punto de desmayarse. Puede resultar difícil conocer la causa de los [place]. Algunas personas se sienten mareadas cuando tienen migrañas. A veces, los episodios gripales pueden hacer que se sienta mareado. Algunas afecciones médicas, dirk los problemas cardíacos o la presión arterial katherine, pueden hacer que se sienta mareado. Muchos medicamentos pueden causar mareos, dirk los que se usan para la presión arterial katherine, el dolor o la ansiedad. Si es un medicamento el que está causando los síntomas, dickerson médico podría recomendarle que lo cambie o deje de tomarlo.  Si es un problema cardíaco, podría necesitar medicamentos para ayudar a que dickerson corazón funcione mejor. Si no hay razón aparente para los síntomas, dickerson médico podría sugerir vigilar y esperar briana un tiempo para emma si los mareos desaparecen por sí solos. La atención de seguimiento es larisa parte clave de dickerson tratamiento y seguridad. Asegúrese de hacer y acudir a todas las citas, y llame a dickerson médico si está teniendo problemas. También es larisa buena idea saber los resultados de nora exámenes y mantener larisa lista de los medicamentos que waqar. ¿Cómo puede cuidarse en el hogar? · Si dickerson médico le recomienda o receta medicamentos, tómelos exactamente según las indicaciones. Llame a dickerson médico si tootie estar teniendo un problema con dickerson medicamento. · No conduzca mientras se sienta mareado. · Trate de prevenir las caídas. Algunas medidas que puede kian son:  Thedore Drought Usar tapetes antideslizantes, agregar agarraderas cerca de la tonya y usar luces nocturnas. ¨ Ordenar dickerson casa de larry manera que en los senderos no haya nada con lo que se pueda tropezar. ¨ Avisarles a familiares y [facility] que se ha estado sintiendo [place]. [place] les servirá para saber cómo ayudarle. ¿Cuándo debe pedir ayuda? Llame al 911 en cualquier momento que considere que necesita atención de [place]. Por ejemplo, llame si:  · Se desmayó (perdió el conocimiento). · Tiene mareos junto con síntomas de un ataque cardíaco. Estos pueden incluir:  ¨ Dolor de pecho, o presión o larisa sensación extraña en el pecho. ¨ Sudoración. ¨ Falta de aire. ¨ Náuseas o vómito. ¨ Dolor, presión, o larisa sensación extraña en la espalda, el elle, la mandíbula o el abdomen superior, o en addi o ambos hombros o brazos. ¨ Aturdimiento o debilidad repentina. ¨ Un latido cardíaco rápido o irregular. · Tiene síntomas de un ataque cerebral. Estos pueden incluir:  ¨ Entumecimiento, hormigueo, debilidad o parálisis repentinos en la carol, el brazo o la pierna, sobre todo si ocurre en un solo lado del cuerpo. ¨ Cambios súbitos en la vista.   ¨ Problemas repentinos para hablar. ¨ Confusión súbita o dificultad repentina para comprender frases sencillas. ¨ Problemas repentinos para caminar o mantener el equilibrio. ¨ Un dolor de dusty intenso y repentino, distinto a los cristine de dusty anteriores. Llame a dickerson médico ahora mismo o busque atención médica inmediata si:  · Se siente mareado y [place], dolor de [place] o zumbido [employer] oídos. · Tiene nuevas náuseas y vómito o [address] · Rachelle mareos no desaparecen o regresan. Preste especial atención a los cambios en dickerson dori y asegúrese de comunicarse con dickerson médico si:  · No mejora dirk se esperaba. ¿Dónde puede encontrar más información en inglés? Roseanne Kasper a http://jules-debbie.info/. Deanna Alejandre P087 en la búsqueda para aprender más acerca de \"Mareos: Instrucciones de cuidado - [ Dizziness: Care Instructions ]. \"  Revisado: 20 marzo, 2017  Versión del contenido: 11.3  © 2217-5298 Healthwise, Incorporated. Las instrucciones de cuidado fueron adaptadas bajo licencia por [employer] (which disclaims liability or warranty for this information). Si usted tiene [place] afección médica o sobre estas instrucciones, siempre pregunte a dickerson profesional de dori. Healthwise, Incorporated niega toda garantía o responsabilidad por dickerson uso de esta información. Aturdimiento o desmayo: Instrucciones de cuidado - [ Babcock Amsler or Faintness: Care Instructions ]  Instrucciones de cuidado  El aturdimiento es la sensación de que está a punto de desmayarse o de \"perder el conocimiento\". No se siente dirk si usted o lo que le rodea se [place]. Es distinto del vértigo, que es la sensación de que usted o las cosas que le rodean dan vueltas o se inclinan. El aturdimiento suele desaparecer o mejorar cuando se acuesta. Si el aturdimiento empeora, esto puede conducir a un desvanecimiento. Es común sentirse aturdido de [employer]. Por lo general, el aturdimiento no se debe a un problema grave.  A menudo es causado por larisa disminución de corta duración de la presión arterial y el flujo de christofer hacia la dusty que se produce al ponerse de pie con demasiada rapidez cuando está acostado o sentado. La atención de seguimiento es larisa parte clave de dickerson tratamiento y seguridad. Asegúrese de hacer y acudir a todas las citas, y llame a dickerson médico si está teniendo problemas. También es larisa buena idea saber los resultados de nora exámenes y mantener larisa lista de los medicamentos que waqar. ¿Cómo puede cuidarse en el hogar? · Acuéstese briana 1 o 2 minutos cuando se sienta aturdido. Después de acostarse, siéntese lentamente y permanezca sentado de 1 a 2 minutos antes de ponerse de pie lentamente. · Evite los movimientos, las posturas o las actividades que le hayan producido aturdimiento en el pasado. · Descanse mucho, en especial si está resfriado o tiene gripe, ya que estas pueden causar aturdimiento. · Asegúrese de beber abundante líquido, en especial si tiene fiebre o si ha estado sudando. · No conduzca ni se ponga a sí mismo o ponga a otros en peligro mientras se sienta aturdido. ¿Cuándo debe pedir ayuda? Llame al 911 en cualquier momento que considere que necesita atención de [place]. Por ejemplo, llame si:  · Tiene síntomas de un ataque cerebral. Estos pueden incluir:  ¨ Entumecimiento, hormigueo, debilidad o parálisis repentinos en la carol, el brazo o la pierna, sobre todo si ocurre en un solo lado del cuerpo. ¨ Cambios súbitos en la vista. ¨ Problemas repentinos para hablar. ¨ Confusión súbita o dificultad repentina para comprender frases sencillas. ¨ Problemas repentinos para caminar o mantener el equilibrio. ¨ Un dolor de dusty intenso y repentino, distinto a los cristine de dusty anteriores. · Tiene síntomas de un ataque al corazón. Estos podrían incluir:  ¨ Dolor o presión en el pecho, o larisa sensación extraña en el pecho. ¨ Sudoración. ¨ Falta de aire. ¨ Náuseas o vómito.   ¨ Dolor, presión o larisa sensación extraña en la espalda, el elle, la mandíbula, la parte superior del abdomen, o en addi o ambos hombros o brazos. ¨ Aturdimiento o debilidad repentina. ¨ Latidos cardíacos rápidos o irregulares. Cuando llame al 911, es posible que le digan que mastique 1 aspirina para adultos o 2 a 4 aspirinas de dosis baja. Espere a la ambulancia. No trate de conducir usted mismo un automóvil. Preste especial atención a los cambios en dickerson dori y asegúrese de comunicarse con dickerson médico si:  · El aturdimiento [place] Handsome o no mejora con los cuidados en el hogar. ¿Dónde puede encontrar más información en inglés? Netta Raza a http://jules-debbie.info/. Tammy Almeida G429 en la búsqueda para aprender más acerca de \"Aturdimiento o desmayo: Instrucciones de cuidado - [ Sharmila Sabot or Faintness: Care Instructions ]. \"  Revisado: 20 marzo, 2017  Versión del contenido: 11.3  © 1524-8624 Healthwise, Incorporated. Las instrucciones de cuidado fueron adaptadas bajo licencia por [employer] (which disclaims liability or warranty for this information). Si usted tiene [place] afección médica o sobre estas instrucciones, siempre pregunte a dickerson profesional de dori. Healthwise, Incorporated niega toda garantía o responsabilidad por dickerson uso de esta información.

## 2017-08-15 NOTE — PROGRESS NOTES
[facility]   Discharge Planning/ Assessment    Reasons for Intervention: Chart reviewed. Met with pt., verified all demographics. \[facility]\"" has NO ins. \[facility]\"" has NO PCP, referral sent to . NOK: Nito Zimmer, spouse, with whom she lives with & designates can participate in her discharge process. Uses no DME. Independent with ADL's prior to admit. PLAN: home. Please try to utilize $4 [employer] drug list @ discharge, if possible, thanks. Available as needed. Sumi Osorio RN,ext. [phone].       High Risk Criteria  [] Yes  [x]No   Physician Referral  [] Yes  [x]No        Date    Nursing Referral  [] Yes  [x]No        Date    Patient/Family Request  [] Yes  [x]No        Date       Resources:    Medicare  [] Yes  [x]No   Medicaid  [] Yes  [x]No   No Resources  [x] Yes  []No   Private Insurance  [] Yes  [x]No    Name/Phone Number    Other  [] Yes  [x]No        (i.e. Workman's Comp)         Prior Services:    Prior Services  [] Yes  [x]No   Home Health  [] Yes  [x]No   [address] [place]  [] Yes  [x]No        [address] of [address] Program  [] Yes  [x]No       Meals on Wheels  [] Yes  [x]No   Office on Aging  [] Yes  [x]No   Transportation Services  [] Yes  [x]No   Nursing Home  [] Yes  [x]No        Nursing Home Name    [facility]  [] Yes  [x]No        [address] Name    Other       Information Source:      Information obtained from  [x] Patient  [] Parent   [] [address]  [] Child  [] Spouse   [] Significant Other/Partner   [] Friend      [] EMS    [] Nursing Home Chart          [] Other:   Chart Review  [x] Yes  []No     Family/Support System:    Patient lives with  [] Alone    [x] Spouse   [] Significant Other  [] Children  [] Caretaker   [] Parent  [] Sibling     [] Other       Other Support System:    Is the patient responsible for care of others  [] Yes  []No   Information of person caring for patient on  discharge    Managers financial affairs independently  [x] Yes  []No   If no, explain:      Status Prior to Admission:    Mental Status  [x] Awake  [x] Alert  [x] Oriented  [x] Quiet/Calm [] Lethargic/Sedated   [] Disoriented  [] Restless/Anxious  [] Combative   Personal Care  [] Dependent  [x] [address]  [] Requires Assistance   Meal Preparation Ability  [x] Independent   [] Standby Assistance   [] Minimal Assistance   [] Moderate Assistance  [] Maximum Assistance     [] Total Assistance   Chores  [x] Independent with Chores   [] N/A Nursing Home Resident   [] Requires Assistance   Bowel/Bladder  [x] Continent  [] Catheter  [] Incontinent  [] Ostomy Self-Care    [] Urine Diversion Self-Care  [] Maximum Assistance     [] Total Assistance   Number of Persons needed for assistance    DME at home  [] Michelle Mac  [] Eri Mac   [] Commode    [] Bathroom/Grab Bars  [] Hospital Bed  [] Nebulizer  [] Oxygen           [] Raised Toilet Seat  [] Shower Chair  [] Side Rails for Bed   [] Tub Transfer Bench   [] Boogie Corbin  [] Taylor Neff, Standard      [] Other:   Vendor      Treatment Presently Receiving:    Current Treatments  [] Chemotherapy  [] Dialysis  [] Insulin  [] IVAB [x] IVF   [] O2  [] PCA   [] PT   [] RT   [] Tube Feedings   [] Wound Care     Psychosocial Evaluation:    Verbalized Knowledge of Disease Process  [] Patient  []Family   Coping with Disease Process  [] Patient  []Family   Requires Further Counseling Coping with Disease Process  [] Patient  []Family     Identified Projected Needs:    Home Health Aid  [] Yes  [x]No   Transportation  [] Yes  [x]No   Education  [] Yes  [x]No        Specific Education     Financial Counseling  [] Yes  [x]No   Inability to Care for Self/Will Require 24 hour care  [] Yes  [x]No   Pain Management  [] Yes  [x]No   Home Infusion Therapy  [] Yes  [x]No   Oxygen Therapy  [] Yes  [x]No   DME  [] Yes  [x]No   Long Term Care Placement  [] Yes  [x]No   Rehab  [] Yes  [x]No   Physical Therapy  [] Yes  [x]No   Needs Anticipated At This Time  [] Yes  [x]No     Intra-Hospital Referral:    [facility]  [] Yes  [x]No     [x] Yes  []No   Patient Representative  [] Yes  [x]No   Staff for Teaching Needs  [] Yes  [x]No   Specialty Teaching Needs     Diabetic Educator  [] Yes  [x]No   Referral for Diabetic Educator Needed  [] Yes  [x]No  If Yes, place order for Nutritionist or Diabetic Consult     Tentative Discharge Plan:    Home with No Services  [x] Yes  []No   Home with [address]  [] Yes  [x]No        If Yes, specify type    Home Care Program  [] Yes  [x]No        If Yes, specify type    Meals on Wheels  [] Yes  [x]No   Office of Aging  [] Yes  [x]No   NHP  [] Yes  [x]No   Return to the Nursing Home  [] Yes  [x]No   Rehab Therapy  [] Yes  [x]No   Acute Rehab  [] Yes  [x]No   Subacute Rehab  [] Yes  [x]No   Private Care  [] Yes  [x]No   Substance Abuse Referral  [] Yes  [x]No   Transportation  [] Yes  [x]No   Chore Service  [] Yes  [x]No   Inpatient Hospice  [] Yes  [x]No   OP RT  [] Yes  [x] No   OP Hemo  [] Yes  [x] No   OP PT  [] Yes  [x]No   Support Group  [] Yes  [x]No   Reach to Recovery  [] Yes  [x]No   OP Oncology Clinic  [] Yes  [x]No   Clinic Appointment  [] Yes  [x]No   DME  [] Yes  [x]No   Comments    Name of D/C Planner or  Given to Patient or Family Yosvany Ayoub   Phone Number Pager: [phone].  5347. Date 08-   Time    If you are discharged home, whom do you designate to participate in your discharge plan and receive any information needed?      Enter name of Victor Manuel [address]        Phone # of gladys [phone]        Address of [address] [address]        Updated         Patient refused to designate any           individual

## 2017-08-15 NOTE — PROGRESS NOTES
Patient and/or next of kin has been given the Outpatient Observation Information and Notification letter and all questions answered. Provided in [facility]. Sumi Osorio RN,ext. [phone].

## 2017-08-15 NOTE — ACP (ADVANCE CARE PLANNING)
Patient has designated ___her spouse_____________________ to participate in his/her discharge plan and to receive any needed information. Name: Ortimoteol Links  Address:  [address] [place] [address].  Jamel Benvirginia [id number]   Phone number:  [phone]

## 2017-08-15 NOTE — PROGRESS NOTES
Problem: Falls - Risk of  Goal: *Absence of Falls  Document Pina Fall Risk and appropriate interventions in the flowsheet.    Outcome: Progressing Towards Goal  Fall Risk Interventions:              Medication Interventions: Assess postural VS orthostatic hypotension

## 2017-08-15 NOTE — PROGRESS NOTES
Bedside and Verbal shift change report given to Angelica Quintana (oncoming nurse) by Porfirio Baltazar RN (offgoing nurse). Report included the following information SBAR, Kardex, MAR and Recent Results. 2010 shift assessment completed,patient a/o x4, present in room ,patient cannot speak english, is able to interpret     0010 shift reassessment completed ,patient sleeping soundly ,no signs of distress noted    0405 shift reassessment completed ,no signs of distress noted    0730Bedside and Verbal shift change report given to Madeleine Vogt (oncoming nurse) by Porfirio Baltazar RN (offgoing nurse). Report included the following information SBAR, Kardex, MAR and Recent Results.

## 2018-04-15 ENCOUNTER — HOSPITAL ENCOUNTER (EMERGENCY)
Age: 33
Discharge: HOME | End: 2018-04-15
Attending: EMERGENCY MEDICINE | Admitting: EMERGENCY MEDICINE
Payer: SELF-PAY

## 2018-04-15 VITALS
TEMPERATURE: 97.8 F | HEIGHT: 60 IN | OXYGEN SATURATION: 100 % | SYSTOLIC BLOOD PRESSURE: 134 MMHG | BODY MASS INDEX: 32.03 KG/M2 | HEART RATE: 99 BPM | DIASTOLIC BLOOD PRESSURE: 85 MMHG | WEIGHT: 163.14 LBS | RESPIRATION RATE: 14 BRPM

## 2018-04-15 DIAGNOSIS — N39.0 URINARY TRACT INFECTION WITHOUT HEMATURIA, SITE UNSPECIFIED: Primary | ICD-10-CM

## 2018-04-15 LAB
ALBUMIN SERPL-MCNC: 3.5 G/DL (ref 3.4–5)
ALBUMIN/GLOB SERPL: 0.9 {RATIO} (ref 0.8–1.7)
ALP SERPL-CCNC: 117 U/L (ref 45–117)
ALT SERPL-CCNC: 30 U/L (ref 13–56)
ANION GAP SERPL CALC-SCNC: 10 MMOL/L (ref 3–18)
APPEARANCE UR: ABNORMAL
AST SERPL-CCNC: 24 U/L (ref 15–37)
BACTERIA URNS QL MICRO: ABNORMAL /HPF
BASOPHILS # BLD: 0 K/UL (ref 0–0.06)
BASOPHILS NFR BLD: 0 % (ref 0–2)
BILIRUB SERPL-MCNC: 0.4 MG/DL (ref 0.2–1)
BILIRUB UR QL: NEGATIVE
BUN SERPL-MCNC: 8 MG/DL (ref 7–18)
BUN/CREAT SERPL: 11 (ref 12–20)
CALCIUM SERPL-MCNC: 8.5 MG/DL (ref 8.5–10.1)
CHLORIDE SERPL-SCNC: 105 MMOL/L (ref 100–108)
CO2 SERPL-SCNC: 26 MMOL/L (ref 21–32)
COLOR UR: ABNORMAL
CREAT SERPL-MCNC: 0.72 MG/DL (ref 0.6–1.3)
DIFFERENTIAL METHOD BLD: ABNORMAL
EOSINOPHIL # BLD: 0.3 K/UL (ref 0–0.4)
EOSINOPHIL NFR BLD: 6 % (ref 0–5)
EPITH CASTS URNS QL MICRO: ABNORMAL /LPF (ref 0–5)
ERYTHROCYTE [DISTWIDTH] IN BLOOD BY AUTOMATED COUNT: 13.2 % (ref 11.6–14.5)
GLOBULIN SER CALC-MCNC: 4 G/DL (ref 2–4)
GLUCOSE SERPL-MCNC: 118 MG/DL (ref 74–99)
GLUCOSE UR STRIP.AUTO-MCNC: NEGATIVE MG/DL
HCG UR QL: NEGATIVE
HCT VFR BLD AUTO: 41.9 % (ref 35–45)
HGB BLD-MCNC: 13.9 G/DL (ref 12–16)
HGB UR QL STRIP: ABNORMAL
KETONES UR QL STRIP.AUTO: ABNORMAL MG/DL
LEUKOCYTE ESTERASE UR QL STRIP.AUTO: ABNORMAL
LIPASE SERPL-CCNC: 105 U/L (ref 73–393)
LYMPHOCYTES # BLD: 1.3 K/UL (ref 0.9–3.6)
LYMPHOCYTES NFR BLD: 22 % (ref 21–52)
MCH RBC QN AUTO: 30.6 PG (ref 24–34)
MCHC RBC AUTO-ENTMCNC: 33.2 G/DL (ref 31–37)
MCV RBC AUTO: 92.3 FL (ref 74–97)
MONOCYTES # BLD: 0.6 K/UL (ref 0.05–1.2)
MONOCYTES NFR BLD: 11 % (ref 3–10)
NEUTS SEG # BLD: 3.4 K/UL (ref 1.8–8)
NEUTS SEG NFR BLD: 61 % (ref 40–73)
NITRITE UR QL STRIP.AUTO: NEGATIVE
PH UR STRIP: 5.5 [PH] (ref 5–8)
PLATELET # BLD AUTO: 182 K/UL (ref 135–420)
PMV BLD AUTO: 11.7 FL (ref 9.2–11.8)
POTASSIUM SERPL-SCNC: 3.5 MMOL/L (ref 3.5–5.5)
PROT SERPL-MCNC: 7.5 G/DL (ref 6.4–8.2)
PROT UR STRIP-MCNC: 30 MG/DL
RBC # BLD AUTO: 4.54 M/UL (ref 4.2–5.3)
RBC #/AREA URNS HPF: ABNORMAL /HPF (ref 0–5)
SODIUM SERPL-SCNC: 141 MMOL/L (ref 136–145)
SP GR UR REFRACTOMETRY: >1.03 (ref 1–1.03)
UROBILINOGEN UR QL STRIP.AUTO: 1 EU/DL (ref 0.2–1)
WBC # BLD AUTO: 5.6 K/UL (ref 4.6–13.2)
WBC URNS QL MICRO: ABNORMAL /HPF (ref 0–4)

## 2018-04-15 PROCEDURE — 81025 URINE PREGNANCY TEST: CPT | Performed by: PHYSICIAN ASSISTANT

## 2018-04-15 PROCEDURE — 96374 THER/PROPH/DIAG INJ IV PUSH: CPT

## 2018-04-15 PROCEDURE — 80053 COMPREHEN METABOLIC PANEL: CPT | Performed by: PHYSICIAN ASSISTANT

## 2018-04-15 PROCEDURE — 99283 EMERGENCY DEPT VISIT LOW MDM: CPT

## 2018-04-15 PROCEDURE — 85025 COMPLETE CBC W/AUTO DIFF WBC: CPT | Performed by: PHYSICIAN ASSISTANT

## 2018-04-15 PROCEDURE — 87086 URINE CULTURE/COLONY COUNT: CPT | Performed by: PHYSICIAN ASSISTANT

## 2018-04-15 PROCEDURE — 81001 URINALYSIS AUTO W/SCOPE: CPT | Performed by: PHYSICIAN ASSISTANT

## 2018-04-15 PROCEDURE — 83690 ASSAY OF LIPASE: CPT | Performed by: PHYSICIAN ASSISTANT

## 2018-04-15 PROCEDURE — 74011250636 HC RX REV CODE- 250/636: Performed by: PHYSICIAN ASSISTANT

## 2018-04-15 PROCEDURE — 87186 SC STD MICRODIL/AGAR DIL: CPT | Performed by: PHYSICIAN ASSISTANT

## 2018-04-15 PROCEDURE — 87077 CULTURE AEROBIC IDENTIFY: CPT | Performed by: PHYSICIAN ASSISTANT

## 2018-04-15 RX ORDER — NITROFURANTOIN 25; 75 MG/1; MG/1
100 CAPSULE ORAL 2 TIMES DAILY
Qty: 14 CAP | Refills: 0 | Status: SHIPPED | OUTPATIENT
Start: 2018-04-15 | End: 2018-04-22

## 2018-04-15 RX ORDER — KETOROLAC TROMETHAMINE 30 MG/ML
15 INJECTION, SOLUTION INTRAMUSCULAR; INTRAVENOUS
Status: COMPLETED | OUTPATIENT
Start: 2018-04-15 | End: 2018-04-15

## 2018-04-15 RX ORDER — PHENAZOPYRIDINE HYDROCHLORIDE 200 MG/1
200 TABLET, FILM COATED ORAL 3 TIMES DAILY
Qty: 6 TAB | Refills: 0 | Status: SHIPPED | OUTPATIENT
Start: 2018-04-15 | End: 2018-04-17

## 2018-04-15 RX ORDER — ONDANSETRON 4 MG/1
4-8 TABLET, ORALLY DISINTEGRATING ORAL
Qty: 15 TAB | Refills: 1 | Status: SHIPPED | OUTPATIENT
Start: 2018-04-15 | End: 2018-07-27 | Stop reason: ALTCHOICE

## 2018-04-15 RX ADMIN — KETOROLAC TROMETHAMINE 15 MG: 30 INJECTION, SOLUTION INTRAMUSCULAR at 19:16

## 2018-04-15 NOTE — ED PROVIDER NOTES
EMERGENCY DEPARTMENT HISTORY AND PHYSICAL EXAM    6:25 PM      Date: 4/15/2018  Patient Name: Myrna Duran    History of Presenting Illness     Chief Complaint   Patient presents with    Headache    Nausea    Vomiting    Diarrhea         History Provided By: Patient and Patient's Son    Chief Complaint: abdominal pain  Duration:  Days  Timing:  Intermittent  Location: abdomen  Quality: Aching and Cramping  Severity: Moderate  Modifying Factors: eating makes worse  Associated Symptoms: nausea, vomiitng, headache, diarrhea      Additional History (Context): Myrna Duran is a 27 y.o. female with No significant past medical history and non smoker who presents with abdominal pain for the past two days. States also has nausea and vomiting. Last kept fluids down this morning. Denies fever. States has mild headache after vomiting. Avinash Brandon PCP: None        Past History     Past Medical History:  History reviewed. No pertinent past medical history. Past Surgical History:  History reviewed. No pertinent surgical history. Family History:  History reviewed. No pertinent family history. Social History:  Social History   Substance Use Topics    Smoking status: Never Smoker    Smokeless tobacco: Never Used    Alcohol use No       Allergies:  No Known Allergies      Review of Systems     Constitutional:  Denies malaise, fever, chills. Head:  Denies injury. Face:  Denies injury or pain. ENMT:  Denies sore throat. Neck:  Denies injury or pain. Chest:  Denies injury. Cardiac:  Denies chest pain or palpitations. Respiratory:  Denies cough, wheezing, difficulty breathing, shortness of breath. GI/ABD:  pain,  nausea, vomiting, diarrhea. :  Denies injury, pain, dysuria or urgency. Back:  Denies injury or pain. Pelvis:  Denies injury or pain. Extremity/MS:  Denies injury or pain. Neuro:  headache,  Denies  LOC, dizziness, neurologic symptoms/deficits/paresthesias.    Skin: Denies injury, rash, itching or skin changes. Physical Exam     Visit Vitals    /85 (BP 1 Location: Right arm, BP Patient Position: At rest)    Pulse 99    Temp 97.8 °F (36.6 °C)    Resp 14    Ht 5' (1.524 m)    Wt 74 kg (163 lb 2.3 oz)    SpO2 100%    BMI 31.86 kg/m2       CONSTITUTIONAL: Alert, in no apparent distress; well-developed; well-nourished. HEAD:  Normocephalic, atraumatic. EYES: PERRL; EOM's intact. No nystagmus  ENTM: Nose: No rhinorrhea; Throat: mucous membranes moist. Posterior pharynx-normal.  Neck:  No JVD, supple without lymphadenopathy. RESP: Chest clear, equal breath sounds. CV: S1 and S2 WNL; No murmurs, gallops or rubs. GI: Abdomen soft and RLQ tenderness, +BS,NG,NR No masses or organomegaly. UPPER EXT:  Normal inspection. LOWER EXT: Normal inspection. NEURO: CN 3-12 grossly intact, no pronator drift, strength 5/5 and sym, sensation intact. SKIN: No rashes; Normal for age and stage. PSYCH:  Alert and oriented, normal affect.         Diagnostic Study Results     Labs -  Recent Results (from the past 12 hour(s))   URINALYSIS W/ RFLX MICROSCOPIC    Collection Time: 04/15/18  5:57 PM   Result Value Ref Range    Color DARK YELLOW      Appearance TURBID      Specific gravity >1.030 (H) 1.005 - 1.030    pH (UA) 5.5 5.0 - 8.0      Protein 30 (A) NEG mg/dL    Glucose NEGATIVE  NEG mg/dL    Ketone TRACE (A) NEG mg/dL    Bilirubin NEGATIVE  NEG      Blood TRACE (A) NEG      Urobilinogen 1.0 0.2 - 1.0 EU/dL    Nitrites NEGATIVE  NEG      Leukocyte Esterase LARGE (A) NEG     HCG URINE, QL    Collection Time: 04/15/18  5:57 PM   Result Value Ref Range    HCG urine, QL NEGATIVE  NEG     CBC WITH AUTOMATED DIFF    Collection Time: 04/15/18  6:15 PM   Result Value Ref Range    WBC 5.6 4.6 - 13.2 K/uL    RBC 4.54 4.20 - 5.30 M/uL    HGB 13.9 12.0 - 16.0 g/dL    HCT 41.9 35.0 - 45.0 %    MCV 92.3 74.0 - 97.0 FL    MCH 30.6 24.0 - 34.0 PG    MCHC 33.2 31.0 - 37.0 g/dL    RDW 13.2 11.6 - 14.5 % PLATELET 252 049 - 707 K/uL    MPV 11.7 9.2 - 11.8 FL    NEUTROPHILS 61 40 - 73 %    LYMPHOCYTES 22 21 - 52 %    MONOCYTES 11 (H) 3 - 10 %    EOSINOPHILS 6 (H) 0 - 5 %    BASOPHILS 0 0 - 2 %    ABS. NEUTROPHILS 3.4 1.8 - 8.0 K/UL    ABS. LYMPHOCYTES 1.3 0.9 - 3.6 K/UL    ABS. MONOCYTES 0.6 0.05 - 1.2 K/UL    ABS. EOSINOPHILS 0.3 0.0 - 0.4 K/UL    ABS. BASOPHILS 0.0 0.0 - 0.06 K/UL    DF AUTOMATED     METABOLIC PANEL, COMPREHENSIVE    Collection Time: 04/15/18  6:15 PM   Result Value Ref Range    Sodium 141 136 - 145 mmol/L    Potassium 3.5 3.5 - 5.5 mmol/L    Chloride 105 100 - 108 mmol/L    CO2 26 21 - 32 mmol/L    Anion gap 10 3.0 - 18 mmol/L    Glucose 118 (H) 74 - 99 mg/dL    BUN 8 7.0 - 18 MG/DL    Creatinine 0.72 0.6 - 1.3 MG/DL    BUN/Creatinine ratio 11 (L) 12 - 20      GFR est AA >60 >60 ml/min/1.73m2    GFR est non-AA >60 >60 ml/min/1.73m2    Calcium 8.5 8.5 - 10.1 MG/DL    Bilirubin, total 0.4 0.2 - 1.0 MG/DL    ALT (SGPT) 30 13 - 56 U/L    AST (SGOT) 24 15 - 37 U/L    Alk. phosphatase 117 45 - 117 U/L    Protein, total 7.5 6.4 - 8.2 g/dL    Albumin 3.5 3.4 - 5.0 g/dL    Globulin 4.0 2.0 - 4.0 g/dL    A-G Ratio 0.9 0.8 - 1.7     LIPASE    Collection Time: 04/15/18  6:15 PM   Result Value Ref Range    Lipase 105 73 - 393 U/L       Radiologic Studies -   No orders to display         Medical Decision Making   I am the first provider for this patient. I reviewed the vital signs, available nursing notes, past medical history, past surgical history, family history and social history. Vital Signs-Reviewed the patient's vital signs. Pulse Oximetry Analysis -  100 on room air (Interpretation)wnl      Records Reviewed: Nursing Notes and Old Medical Records (Time of Review: 6:25 PM)    ED Course: Progress Notes, Reevaluation, and Consults:    7:26 PM  Pt tolerating PO fluids at this time. Denies abdominal pain at this time.     Provider Notes (Medical Decision Making):   DDx: gastroenteritis, GERD, hernia, hepatitis, pancreatitis, gallbladder etiology, constipation, adhesions, UTI, pyelo, kidney stones, STD,  Kqdm-Vdae-Ugfils syndrome, preg, ectopic, ovarian cyst, ovarian torsion, tubo-ovarian abscess, appendicitis, diverticulitis, SBO, GI bleed, mesenteric ischemia, AAA, cardiac etiology, musculoskeletal pain/spasm, malignancy  IMPRESSION AND MEDICAL DECISION MAKING:  Based upon the patient's presentation with noted HPI and PE, along with the work up done in the emergency department, I believe that the patient has a UTI. Will treat and have her follow up with her PCP. The patient will be discharged home. Warning signs of worsening condition were discussed and understood by the patient. Based on patient's age, coexisting illness, exam, and the results of this ED evaluation, the decision to treat as an outpatient was made. Based on the information available at time of discharge, acute pathology requiring immediate intervention was deemed relative unlikely. While it is impossible to completely exclude the possibility of underlying serious disease or worsening of condition, I feel the relative likelihood is extremely low. I discussed this uncertainty with the patient, who understood ED evaluation and treatment and felt comfortable with the outpatient treatment plan. All questions regarding care, test results, and follow up were answered. The patient is stable and appropriate to discharge. They understand that they should return to the emergency department for any new or worsening symptoms. I stressed the importance of follow up for repeat assessment and possibly further evaluation/treatment. Diagnosis     Clinical Impression:   1.  Urinary tract infection without hematuria, site unspecified      _______________________________

## 2018-04-15 NOTE — DISCHARGE INSTRUCTIONS
Infección urinaria en las mujeres: Instrucciones de cuidado - [ Urinary Tract Infection in Women: Care Instructions ]  Instrucciones de cuidado    Larisa infección urinaria (UTI, por nora siglas en inglés) es un término general que hace referencia a larisa infección que se produce en cualquier parte entre los riñones y la uretra (conducto por el cual se expulsa la orina). La mayoría de las UTI son infecciones de la vejiga. Con frecuencia, causan dolor o ardor al JayyLakshmi. Las UTI son causadas por bacterias y pueden curarse con antibióticos. Asegúrese de completar el tratamiento para que la infección desaparezca. La atención de seguimiento es larisa parte clave de dickerson tratamiento y seguridad. Asegúrese de hacer y acudir a todas las citas, y llame a dickerson médico si está teniendo problemas. También es larisa buena idea saber los resultados de los exámenes y mantener larisa lista de los medicamentos que waqar. ¿Cómo puede cuidarse en el hogar? · [address] No deje de tomarlos por el hecho de sentirse mejor. Debe kian todos los antibióticos hasta terminarlos. · Tyrell los próximos addi o [address], tara mayor cantidad de [place] y otros líquidos. [place] puede ayudar a eliminar las bacterias que provocan la infección. (Si tiene larisa enfermedad de los riñones, el corazón o el hígado y tiene que McCrory's líquidos, hable con dickerson médico antes de aumentar dickerson consumo). · Evite las bebidas gaseosas o con cafeína. Pueden irritar la vejiga. · Orine con frecuencia. Trate de vaciar la vejiga cada vez que orine. · Para aliviar el dolor, tome un baño caliente o colóquese larisa almohadilla térmica a baja temperatura sobre la parte baja del abdomen o la tra genital. Nunca se duerma mientras [place] larisa almohadilla térmica. Para prevenir las infecciones urinarias  · Tara abundante agua todos los días. [place] la ayuda a orinar con frecuencia, lo que elimina las bacterias de dickerson organismo.  (Si tiene [employer] riñones, el corazón o el hígado y tiene que Frandy's líquidos, hable con dickerson médico antes de aumentar dickerson consumo). · Orine cuando necesite hacerlo. · Orine inmediatamente después de charles tenido [employer]. · Cámbiese las toallas sanitarias con frecuencia. · Evite el uso de lavados vaginales, los rani de burbujas, los R[place]terschen de higiene femenina y otros productos para la higiene femenina que contengan desodorantes. · Después de ir al baño, límpiese de adelante hacia atrás. ¿Cuándo debes pedir ayuda? Llama a tu médico ahora mismo o busca atención médica inmediata si:  ? · Aparecen síntomas dirk fiebre, escalofríos, náuseas o vómitos por Donneta Presser, o empeoran. ? · Te empieza a doler la espalda, blanka debajo de la caja torácica. A esto se le llama dolor en el flanco.   ? · Aparece christofer o pus en la orina. ? · Tienes problemas con los antibióticos. ?Presta especial atención a los cambios en tu dori y asegúrate de comunicarte con tu médico si:  ? · No mejoras después de charles tomado un antibiótico briana 2 días. ? · Los síntomas desaparecen y Renelle Gum. ¿Dónde puede encontrar más información en inglés? Rangel cool http://jules-debbie.info/. Anatoly [email or web address] en la búsqueda para aprender más acerca de \"Infección urinaria en las mujeres: Instrucciones de cuidado - [ Urinary Tract Infection in Women: Care Instructions ]. \"  Revisado: 12 Dwarf, 2017  Versión del contenido: 11.4  © 7298-0239 [employer]. Las instrucciones de cuidado fueron adaptadas bajo licencia por [employer] (which disclaims liability or warranty for this information). Si usted tiene [place] afección médica o sobre estas instrucciones, siempre pregunte a dickerson profesional de dori. [facility] niega toda garantía o responsabilidad por dickerson uso de esta información.

## 2018-04-16 NOTE — ED NOTES
I have reviewed discharge instructions with the patient via telephone interpretor. The patient verbalized understanding via telephone interpretor.

## 2018-04-18 LAB
BACTERIA SPEC CULT: ABNORMAL
SERVICE CMNT-IMP: ABNORMAL

## 2018-07-27 ENCOUNTER — HOSPITAL ENCOUNTER (EMERGENCY)
Age: 33
Discharge: HOME | End: 2018-07-27
Attending: EMERGENCY MEDICINE | Admitting: EMERGENCY MEDICINE
Payer: SELF-PAY

## 2018-07-27 VITALS
TEMPERATURE: 97.5 F | SYSTOLIC BLOOD PRESSURE: 113 MMHG | RESPIRATION RATE: 11 BRPM | HEART RATE: 85 BPM | WEIGHT: 178 LBS | BODY MASS INDEX: 34.76 KG/M2 | DIASTOLIC BLOOD PRESSURE: 76 MMHG | OXYGEN SATURATION: 98 %

## 2018-07-27 DIAGNOSIS — N30.00 ACUTE CYSTITIS WITHOUT HEMATURIA: Primary | ICD-10-CM

## 2018-07-27 DIAGNOSIS — G44.209 ACUTE NON INTRACTABLE TENSION-TYPE HEADACHE: ICD-10-CM

## 2018-07-27 DIAGNOSIS — Z32.01 PREGNANCY TEST PERFORMED, PREGNANCY CONFIRMED: ICD-10-CM

## 2018-07-27 LAB
APPEARANCE UR: CLEAR
BACTERIA URNS QL MICRO: ABNORMAL /HPF
BILIRUB UR QL: NEGATIVE
COLOR UR: YELLOW
EPITH CASTS URNS QL MICRO: ABNORMAL /LPF (ref 0–5)
GLUCOSE UR STRIP.AUTO-MCNC: NEGATIVE MG/DL
HCG UR QL: POSITIVE
HGB UR QL STRIP: NEGATIVE
KETONES UR QL STRIP.AUTO: NEGATIVE MG/DL
LEUKOCYTE ESTERASE UR QL STRIP.AUTO: ABNORMAL
NITRITE UR QL STRIP.AUTO: NEGATIVE
PH UR STRIP: 6 [PH] (ref 5–8)
PROT UR STRIP-MCNC: NEGATIVE MG/DL
RBC #/AREA URNS HPF: ABNORMAL /HPF (ref 0–5)
SP GR UR REFRACTOMETRY: 1.01 (ref 1–1.03)
UROBILINOGEN UR QL STRIP.AUTO: 0.2 EU/DL (ref 0.2–1)
WBC URNS QL MICRO: >100 /HPF (ref 0–4)

## 2018-07-27 PROCEDURE — 74011250637 HC RX REV CODE- 250/637: Performed by: PHYSICIAN ASSISTANT

## 2018-07-27 PROCEDURE — 81001 URINALYSIS AUTO W/SCOPE: CPT | Performed by: PHYSICIAN ASSISTANT

## 2018-07-27 PROCEDURE — 99283 EMERGENCY DEPT VISIT LOW MDM: CPT

## 2018-07-27 PROCEDURE — 81025 URINE PREGNANCY TEST: CPT | Performed by: PHYSICIAN ASSISTANT

## 2018-07-27 RX ORDER — DOXYLAMINE SUCCINATE AND PYRIDOXINE HYDROCHLORIDE, DELAYED RELEASE TABLETS 10 MG/10 MG 10; 10 MG/1; MG/1
1 TABLET, DELAYED RELEASE ORAL
Qty: 20 TAB | Refills: 0 | Status: SHIPPED | OUTPATIENT
Start: 2018-07-27

## 2018-07-27 RX ORDER — CEPHALEXIN 500 MG/1
500 CAPSULE ORAL 2 TIMES DAILY
Qty: 14 CAP | Refills: 0 | Status: SHIPPED | OUTPATIENT
Start: 2018-07-27 | End: 2018-08-03

## 2018-07-27 RX ORDER — FOLIC ACID/MULTIVIT,IRON,MINER 0.4MG-18MG
1 TABLET ORAL DAILY
Qty: 30 TAB | Refills: 0 | Status: SHIPPED | OUTPATIENT
Start: 2018-07-27

## 2018-07-27 RX ORDER — ACETAMINOPHEN 500 MG
1000 TABLET ORAL
Status: COMPLETED | OUTPATIENT
Start: 2018-07-27 | End: 2018-07-27

## 2018-07-27 RX ADMIN — ACETAMINOPHEN 1000 MG: 500 TABLET, FILM COATED ORAL at 21:06

## 2018-07-28 NOTE — ED PROVIDER NOTES
EMERGENCY DEPARTMENT HISTORY AND PHYSICAL EXAM 
 
8:57 PM 
 
 
Date: 7/27/2018 Patient Name: Kt Roy History of Presenting Illness Chief Complaint Patient presents with  Nausea  Pregnancy Problem History Provided By: Patient Chief Complaint: headache Duration: 1 Months Timing:  Intermittent and Waxing and Waning Location: diffuse Quality: Aching Severity: Mild Modifying Factors: not taking any medications for it Associated Symptoms: nausea Additional History (Context): Kt Roy is a 26 y.o. female with No significant past medical history who presents with nausea and headache x 1 month. She does not drink and water. She has not taken any medications for the headache. She took a positive home pregnancy test.  LMP 7/1/18. She denies vomiting, abdominal pain, vaginal bleeding. PCP: None Current Outpatient Prescriptions Medication Sig Dispense Refill  cephALEXin (KEFLEX) 500 mg capsule Take 1 Cap by mouth two (2) times a day for 7 days. 14 Cap 0  
 doxylamine-pyridoxine, vit B6, (DICLEGIS) 10-10 mg TbEC DR tablet Take 1 Tab by mouth nightly. 20 Tab 0  prenatal vitamin (PRENATAL) 28 mg iron- 800 mcg tab tablet Take 1 Tab by mouth daily. 30 Tab 0  
 alum-mag hydroxide-simeth (MYLANTA) 200-200-20 mg/5 mL susp Take 30 mL by mouth four (4) times daily as needed. 354 mL 0  
 acetaminophen (TYLENOL EXTRA STRENGTH) 500 mg tablet Take 2 Tabs by mouth every six (6) hours as needed for Pain. 30 Tab 0 Past History Past Medical History: 
History reviewed. No pertinent past medical history. Past Surgical History: 
History reviewed. No pertinent surgical history. Family History: 
History reviewed. No pertinent family history. Social History: 
Social History Substance Use Topics  Smoking status: Never Smoker  Smokeless tobacco: Never Used  Alcohol use No  
 
 
Allergies: 
No Known Allergies Review of Systems Review of Systems Constitutional: Negative for fever. HENT: Negative for facial swelling. Eyes: Negative for visual disturbance. Respiratory: Negative for shortness of breath. Cardiovascular: Negative for chest pain. Gastrointestinal: Positive for nausea. Negative for abdominal pain. Genitourinary: Negative for dysuria. Musculoskeletal: Negative for neck pain. Skin: Negative for rash. Neurological: Positive for headaches. Negative for dizziness. Psychiatric/Behavioral: Negative for confusion. All other systems reviewed and are negative. Physical Exam  
 
Visit Vitals  /76 (BP 1 Location: Right arm, BP Patient Position: Sitting)  Pulse 85  Temp 97.5 °F (36.4 °C)  Resp 11  Wt 80.7 kg (178 lb)  LMP 06/01/2018  SpO2 98%  BMI 34.76 kg/m2 Physical Exam  
Constitutional: She is oriented to person, place, and time. She appears well-developed and well-nourished. No distress. HENT:  
Head: Normocephalic and atraumatic. Eyes: Conjunctivae are normal.  
Neck: Normal range of motion. Cardiovascular: Normal rate and regular rhythm. Pulmonary/Chest: Effort normal.  
Abdominal: She exhibits no distension. There is no tenderness. Musculoskeletal: Normal range of motion. Neurological: She is alert and oriented to person, place, and time. Skin: Skin is warm and dry. She is not diaphoretic. Psychiatric: She has a normal mood and affect. Nursing note and vitals reviewed. Diagnostic Study Results Labs - Recent Results (from the past 12 hour(s)) URINALYSIS W/ RFLX MICROSCOPIC Collection Time: 07/27/18  9:01 PM  
Result Value Ref Range Color YELLOW Appearance CLEAR Specific gravity 1.012 1.005 - 1.030    
 pH (UA) 6.0 5.0 - 8.0 Protein NEGATIVE  NEG mg/dL Glucose NEGATIVE  NEG mg/dL Ketone NEGATIVE  NEG mg/dL Bilirubin NEGATIVE  NEG Blood NEGATIVE  NEG Urobilinogen 0.2 0.2 - 1.0 EU/dL Nitrites NEGATIVE  NEG Leukocyte Esterase LARGE (A) NEG    
HCG URINE, QL Collection Time: 07/27/18  9:01 PM  
Result Value Ref Range HCG urine, QL POSITIVE (A) NEG URINE MICROSCOPIC ONLY Collection Time: 07/27/18  9:01 PM  
Result Value Ref Range WBC >100 (H) 0 - 4 /hpf  
 RBC NONE 0 - 5 /hpf Epithelial cells 2+ 0 - 5 /lpf Bacteria 2+ (A) NEG /hpf Radiologic Studies - No orders to display Medical Decision Making I am the first provider for this patient. I reviewed the vital signs, available nursing notes, past medical history, past surgical history, family history and social history. Vital Signs-Reviewed the patient's vital signs. Records Reviewed: Nursing Notes (Time of Review: 8:57 PM) ED Course: Progress Notes, Reevaluation, and Consults: 
 
Confirmed pregnancy. No abd pain or vaginal bleeding. Needs to hydrate. Also has UTI . Referred to OB, rx for diclegis, prenatals, and abx. Discussed treatment plan, return precautions, symptomatic relief, and expected time to improvement. All questions answered. Patient is stable for discharge and outpatient management. Provider Notes (Medical Decision Making): MDM Number of Diagnoses or Management Options Diagnosis management comments: Nausea and headache x 1 month; positive home rpegnancy test.  No RF for ectopic. Diagnosis Clinical Impression: 1. Acute cystitis without hematuria 2. Pregnancy test performed, pregnancy confirmed 3. Acute non intractable tension-type headache Disposition:  
 
Follow-up Information Follow up With Details Comments Contact Info Erik De Los Santos DO Schedule an appointment as soon as possible for a visit  [place] [phone] 
[phone] [facility] EMERGENCY DEPT  Immediately if symptoms worsen [address] 
[phone] Patient's Medications Start Taking  CEPHALEXIN (KEFLEX) 500 MG CAPSULE    Take 1 Cap by mouth two (2) times a day for 7 days. DOXYLAMINE-PYRIDOXINE, VIT B6, (DICLEGIS) 10-10 MG TBEC DR TABLET    Take 1 Tab by mouth nightly. PRENATAL VITAMIN (PRENATAL) 28 MG IRON- 800 MCG TAB TABLET    Take 1 Tab by mouth daily. Continue Taking ACETAMINOPHEN (TYLENOL EXTRA STRENGTH) 500 MG TABLET    Take 2 Tabs by mouth every six (6) hours as needed for Pain. ALUM-MAG HYDROXIDE-SIMETH (MYLANTA) 200-200-20 MG/5 ML SUSP    Take 30 mL by mouth four (4) times daily as needed. These Medications have changed No medications on file Stop Taking ONDANSETRON (ZOFRAN ODT) 4 MG DISINTEGRATING TABLET    Take 1 Tab by mouth every eight (8) hours as needed for Nausea. ONDANSETRON (ZOFRAN ODT) 4 MG DISINTEGRATING TABLET    Take 1-2 Tabs by mouth every eight (8) hours as needed for Nausea.  
 
_______________________________ Attestations: 
Scribe Attestation Chino Conn PA-C acting as a scribe for and in the presence of Federico [place] July 27, 2018 at 10:08 PM 
    
Provider Attestation:     
I personally performed the services described in the documentation, reviewed the documentation, as recorded by the scribe in my presence, and it accurately and completely records my words and actions. July 27, 2018 at 10:08 PM - PRABHA Caballero 
_______________________________

## 2018-07-28 NOTE — ED TRIAGE NOTES
Patient reports nausea and headache for 5weeks. Patient took home pregnancy test and it was positive.

## 2018-07-28 NOTE — DISCHARGE INSTRUCTIONS
Dolor de dusty: Instrucciones de cuidado - [ Headache: Care Instructions ]  Instrucciones de cuidado    Los cristine de dusty tienen muchas causas posibles. La mayoría de los cristine de dusty no son señal de un problema más danilo y mejoran por sí solos. El tratamiento en el hogar podría ayudarlo a sentirse mejor con Ltanya Mule. El médico lo ha revisado minuciosamente, alvaro puede desarrollar problemas más tarde. Si nota algún problema o síntomas, busque tratamiento médico inmediatamente. La atención de seguimiento es larisa parte clave de dickerson tratamiento y seguridad. Asegúrese de hacer y acudir a todas las citas, y llame a dickerson médico si está teniendo problemas. También es larisa buena idea saber los resultados de nora exámenes y mantener larisa lista de los medicamentos que waqar. ¿Cómo puede cuidarse en el hogar? · No conduzca si ha tomado analgésicos (medicamentos para el dolor) recetados. · Descanse en un cuarto tranquilo y oscuro hasta que desaparezca el dolor de [place]. Cierre los ojos y trate de relajarse o dormirse. No silvino la televisión ni stevan. · Colóquese un paño frío y húmedo o [facility] compresa fría sobre la tra adolorida de 10 a 21 minutos cada vez. Póngase un paño mejía entre la compresa fría y la piel. · Utilice larisa toalla húmeda tibia o larisa almohadilla térmica ajustada a baja temperatura para relajar los músculos tensos del elle y los hombros.  · Pídale a alguien que le timmy masajes suaves en el elle y los hombros.  · [place] los analgésicos exactamente dirk le fueron indicados. ¨ Si el médico le recetó un analgésico, tómelo según las indicaciones. ¨ Si no está tomando un analgésico recetado, pregúntele a dickerson médico si puede kian addi de [employer]. · Tenga cuidado de no kian analgésicos con mayor frecuencia que la permitida en las indicaciones porque los cristine de dusty podrían empeorar o aparecer con mayor frecuencia larisa vez que el medicamento pierda dickerson [place].   · Preste atención a los nuevos síntomas que aparecen con el dolor de [place], [place], debilidad o entumecimiento, cambios en la visión o confusión. Podrían ser señales de un problema más grave. Para prevenir los cristine de dusty  · [employer] un diario de nora cristine de dusty para que pueda averiguar qué los desencadena. Evitar los desencadenantes podría ayudar a prevenir los cristine de [place]. Anote cuándo empieza cada dolor de [place], cuánto dura y cómo es el dolor (palpitante, francisco, punzante o sordo). Anote cualquier otro síntoma que haya tenido con el dolor de [place], [place] náuseas, destellos de olga o PAMELA, o sensibilidad a la olga brillante o a los ruidos musa. Anote si el dolor de dusty ocurrió cerca de dickerson menstruación. Enumere todos los factores que pudieran charles desencadenado el dolor de [place], dirk ciertos alimentos (chocolate, queso, vino) u olores, humo, luces brillantes, estrés o falta de sueño. · Encuentre maneras saludables de [facility]. Los cristine de [place] son más comunes briana o blanka después de un momento estresante. Tómese un tiempo para relajarse antes y después de hacer algo que le haya causado un dolor de dusty en el pasado. · Trate de mantener nora músculos relajados mediante larisa buena postura. Revise si tiene [employer] de la [place], la carol, el elle y los hombros y trate de relajarlos. Cuando se siente en un escritorio, cambie de posición con frecuencia y estírese por 27 segundos cada hora. · Cesar suficiente ejercicio y duerma bastante. · Coma en forma regular y justin. Largos períodos sin comer pueden provocar un dolor de dusty. · Regálese un masaje. Algunas personas encuentran que los masajes hechos con regularidad son [place] para aliviar la tensión. · Limite la cafeína. No tod demasiado café, té ni sodas. Rissa no deje de consumir cafeína de repente, porque eso también puede provocarle cristine de [place].   · Reduzca la tensión en los ojos a causa de la computadora parpadeando con frecuencia y apartando la mirada de la pantalla a menudo. Asegúrese de tener lentes adecuados y de que dickerson monitor esté colocado de manera correcta, dirk a un brazo de distancia. · Busque ayuda si tiene depresión o ansiedad. Rachelle cristine de [place] podrían relacionarse con estas afecciones. El tratamiento puede prevenir los cristine de [place] y ayudar con los síntomas de ansiedad o depresión. ¿Cuándo debe pedir ayuda? Llame al 911 en cualquier momento que piense que puede necesitar atención de emergencia. Por ejemplo, llame si:    · Tiene señales de un ataque cerebral. Estas pueden incluir:  ¨ Parálisis, entumecimiento o debilidad repentinos en la carol, el brazo o la pierna, sobre todo si ocurre en un solo lado del cuerpo. ¨ Cambios repentinos en la visión. ¨ Dificultades repentinas para hablar. ¨ Confusión repentina o dificultad para comprender frases sencillas. ¨ Problemas repentinos para caminar o mantener el equilibrio. ¨ Dolor de [place] intenso y repentino, distinto de los cristine de dusty anteriores.    Llame a dickerson médico ahora mismo o busque atención médica inmediata si:    · Tiene un dolor de [place] o peor.     · Dickerson dolor de dusty empeora mucho. ¿Dónde puede encontrar más información en inglés? Madeleine Lemons a http://jules-debbie.info/. Escriba Z855 en la búsqueda para aprender más acerca de \"Dolor de dusty: Instrucciones de cuidado - [ Headache: Care Instructions ]. \"  Revisado: 9 octubre, 2017  Versión del contenido: 11.7  © 5930-5960 [employer]. Las instrucciones de cuidado fueron adaptadas bajo licencia por [employer] (which disclaims liability or warranty for this information). Si usted tiene [place] afección médica o sobre estas instrucciones, siempre pregunte a dickerson profesional de dori. [employer] niega toda garantía o responsabilidad por dickerson uso de esta información.          Zia Roberth briana el embarazo: Instrucciones de cuidado - [ Urinary Tract Infection in Pregnancy: Care Instructions ]  Instrucciones de cuidado    Larisa infección urinaria (UTI, por nora siglas en inglés) es larisa infección de la vejiga y Curvin Vinicius estructuras urinarias. La mayoría de las UTI ocurren en la vejiga. Con frecuencia, causan dolor o ardor al JayyLakshmi. La UTI es la infección bacteriana más común briana el [facility]. Si no se trata, larisa UTI puede causar problemas dirk larisa infección renal o un parto prematuro. La mayoría de las UTI pueden curarse con antibióticos. Dickerson médico le recetará un antibiótico que sea seguro briana el [facility]. Asegúrese de kian todos nora medicamentos para que la infección no se extienda a los riñones. La atención de seguimiento es larisa parte clave de dcikerson tratamiento y seguridad. Asegúrese de hacer y acudir a todas las citas, y llame a dickerson médico si está teniendo problemas. También es larisa buena idea saber los resultados de nora exámenes y mantener larisa lista de los medicamentos que waqar. ¿Cómo puede cuidarse en el hogar? · [address] No deje de tomarlos por el hecho de sentirse mejor. Debe ikan todos los antibióticos hasta terminarlos. · Briana los próximos addi o [address], tara mayor cantidad de [place] y otros líquidos. [facility] ayudará a eliminar las bacterias que están causando la infección. Si tiene [facility], el corazón o el hígado y tiene que [employer] líquidos, hable con dickerson médico antes de aumentar dickerson consumo. · No tara alcohol. · Orine con frecuencia. Trate de vaciar la vejiga cada vez que orine. Prevención de UTI  · Tara abundantes líquidos, los suficientes para que dickerson orina sea de color amarillo steven o transparente dirk el agua. [facility] la ayuda a orinar con frecuencia, lo que elimina las bacterias de dickerson organismo.  Si tiene [place] [facility], el corazón o el hígado y tiene que [place] líquidos, hable con dickerson médico antes de aumentar dickerson consumo. · Orine en cuanto tenga la necesidad de hacerlo. · Orine inmediatamente después de charles tenido [employer]. Para las mujeres, telly es la mejor manera de evitar las UTI. · Cuando vaya al baño, límpiese de adelante hacia atrás para evitar que entren bacterias a la vagina o la uretra. ¿Cuándo debe pedir ayuda? Llame a dickerson médico ahora mismo o busque atención médica inmediata si:    · Síntomas dirk fiebre, escalofríos, náuseas o vómitos aparecen por primera vez o empeoran.     · Tiene un nuevo dolor en la espalda blanka debajo de la caja torácica. [place] se llama dolor de flanco.     · Tiene christofer o pus nuevos en la orina.     · Tiene cualquier problema con nora antibióticos.    Preste especial atención a los cambios en dickerson dori y asegúrese de comunicarse con dickerson médico si:    · No mejora después de 1 día (24 horas).     · Tiene síntomas nuevos, dirk christofer en la [facility]. ¿Dónde puede encontrar más información en inglés? Danie Kolb a http://jules-debbie.info/. Escriba M982 en la búsqueda para aprender más acerca de \"Infección urinaria briana el embarazo: Instrucciones de cuidado - [ Urinary Tract Infection in Pregnancy: Care Instructions ]. \"  Revisado: 21 noviembre, 2017  Versión del contenido: 11.7  © 0794-5797 [employer] Inc[employer]. Las instrucciones de cuidado fueron adaptadas bajo licencia por [employer] (which disclaims liability or warranty for this information). Si usted tiene Starr Hanahan afección médica o sobre estas instrucciones, siempre pregunte a dickerson profesional de dori. [employer] niega toda garantía o responsabilidad por dickerson uso de esta información.

## 2018-09-27 ENCOUNTER — HOSPITAL ENCOUNTER (EMERGENCY)
Age: 33
Discharge: HOME | End: 2018-09-28
Attending: EMERGENCY MEDICINE
Payer: SELF-PAY

## 2018-09-27 DIAGNOSIS — N39.0 ACUTE UTI: ICD-10-CM

## 2018-09-27 DIAGNOSIS — O26.899 ABDOMINAL PAIN COMPLICATING PREGNANCY: Primary | ICD-10-CM

## 2018-09-27 DIAGNOSIS — R10.9 ABDOMINAL PAIN COMPLICATING PREGNANCY: Primary | ICD-10-CM

## 2018-09-27 DIAGNOSIS — J06.9 ACUTE URI: ICD-10-CM

## 2018-09-27 PROCEDURE — 99284 EMERGENCY DEPT VISIT MOD MDM: CPT

## 2018-09-27 PROCEDURE — 74011250637 HC RX REV CODE- 250/637: Performed by: EMERGENCY MEDICINE

## 2018-09-27 PROCEDURE — 81001 URINALYSIS AUTO W/SCOPE: CPT | Performed by: EMERGENCY MEDICINE

## 2018-09-27 PROCEDURE — 87086 URINE CULTURE/COLONY COUNT: CPT | Performed by: EMERGENCY MEDICINE

## 2018-09-27 RX ORDER — ACETAMINOPHEN 500 MG
1000 TABLET ORAL
Status: COMPLETED | OUTPATIENT
Start: 2018-09-27 | End: 2018-09-27

## 2018-09-27 RX ADMIN — ACETAMINOPHEN 1000 MG: 500 TABLET, FILM COATED ORAL at 23:28

## 2018-09-28 VITALS
DIASTOLIC BLOOD PRESSURE: 68 MMHG | HEIGHT: 62 IN | OXYGEN SATURATION: 100 % | BODY MASS INDEX: 33.68 KG/M2 | SYSTOLIC BLOOD PRESSURE: 109 MMHG | WEIGHT: 183 LBS | HEART RATE: 81 BPM | RESPIRATION RATE: 18 BRPM | TEMPERATURE: 98.3 F

## 2018-09-28 LAB
APPEARANCE UR: ABNORMAL
BACTERIA URNS QL MICRO: ABNORMAL /HPF
BILIRUB UR QL: NEGATIVE
COLOR UR: YELLOW
EPITH CASTS URNS QL MICRO: ABNORMAL /LPF (ref 0–5)
GLUCOSE UR STRIP.AUTO-MCNC: NEGATIVE MG/DL
HGB UR QL STRIP: NEGATIVE
KETONES UR QL STRIP.AUTO: NEGATIVE MG/DL
LEUKOCYTE ESTERASE UR QL STRIP.AUTO: ABNORMAL
NITRITE UR QL STRIP.AUTO: NEGATIVE
PH UR STRIP: 6.5 [PH] (ref 5–8)
PROT UR STRIP-MCNC: NEGATIVE MG/DL
RBC #/AREA URNS HPF: NEGATIVE /HPF (ref 0–5)
SP GR UR REFRACTOMETRY: 1.02 (ref 1–1.03)
UROBILINOGEN UR QL STRIP.AUTO: 0.2 EU/DL (ref 0.2–1)
WBC URNS QL MICRO: ABNORMAL /HPF (ref 0–4)

## 2018-09-28 PROCEDURE — 74011250637 HC RX REV CODE- 250/637: Performed by: EMERGENCY MEDICINE

## 2018-09-28 RX ORDER — CEPHALEXIN 500 MG/1
500 CAPSULE ORAL 2 TIMES DAILY
Qty: 14 CAP | Refills: 0 | Status: SHIPPED | OUTPATIENT
Start: 2018-09-28 | End: 2018-10-05

## 2018-09-28 RX ORDER — DIPHENHYDRAMINE HCL 25 MG
25 CAPSULE ORAL
Qty: 21 CAP | Refills: 0 | Status: SHIPPED | OUTPATIENT
Start: 2018-09-28

## 2018-09-28 RX ORDER — DIPHENHYDRAMINE HCL 25 MG
25 CAPSULE ORAL
Status: COMPLETED | OUTPATIENT
Start: 2018-09-28 | End: 2018-09-28

## 2018-09-28 RX ORDER — ACETAMINOPHEN 500 MG
1000 TABLET ORAL
Qty: 50 TAB | Refills: 0 | Status: SHIPPED | OUTPATIENT
Start: 2018-09-28

## 2018-09-28 RX ORDER — DIPHENHYDRAMINE HCL 25 MG
CAPSULE ORAL
Status: DISCONTINUED
Start: 2018-09-28 | End: 2018-09-28 | Stop reason: HOSPADM

## 2018-09-28 RX ORDER — CEPHALEXIN 250 MG/1
1000 CAPSULE ORAL
Status: COMPLETED | OUTPATIENT
Start: 2018-09-28 | End: 2018-09-28

## 2018-09-28 RX ADMIN — DIPHENHYDRAMINE HYDROCHLORIDE 25 MG: 25 CAPSULE ORAL at 01:26

## 2018-09-28 RX ADMIN — CEPHALEXIN 1000 MG: 250 CAPSULE ORAL at 01:26

## 2018-09-28 NOTE — ED PROVIDER NOTES
HPI Comments: Scooter Haynes is a 26 y.o. Female approx 16 wks with single fetus with c/o lower abd cramping tonight that was made worse with sneezing coughing, runny nose. No fever, nvd, urinary sx, vag bleeding. No sig issues with preg thus far. Sx worse with movement. Nothing taken The history is provided by the patient and the spouse. The history is limited by a language barrier. A  was used. No past medical history on file. No past surgical history on file. No family history on file. Social History Social History  Marital status:  Spouse name: N/A  
 Number of children: N/A  
 Years of education: N/A Occupational History  Not on file. Social History Main Topics  Smoking status: Never Smoker  Smokeless tobacco: Never Used  Alcohol use No  
 Drug use: No  
 Sexual activity: Not on file Other Topics Concern  Not on file Social History Narrative ** Merged History Encounter ** ALLERGIES: Review of patient's allergies indicates no known allergies. Review of Systems Constitutional: Negative for fever. HENT: Positive for congestion, postnasal drip and rhinorrhea. Negative for sore throat, trouble swallowing and voice change. Eyes: Negative for itching and visual disturbance. Respiratory: Negative for cough and shortness of breath. Cardiovascular: Negative for chest pain. Gastrointestinal: Positive for abdominal pain. Endocrine: Negative for polyuria. Genitourinary: Positive for pelvic pain. Negative for difficulty urinating and vaginal bleeding. Musculoskeletal: Negative for gait problem. Skin: Negative for rash. Allergic/Immunologic: Negative for immunocompromised state. Neurological: Negative for syncope. Psychiatric/Behavioral: Positive for sleep disturbance. Vitals:  
 09/27/18 2312 09/28/18 0033 BP: 109/68 Pulse: 81 Resp: 18 Temp: 98.3 °F (36.8 °C) SpO2: 100% 100% Weight: 83 kg (183 lb) Height: 5' 2\" (1.575 m) Physical Exam  
Constitutional: She is oriented to person, place, and time. She appears well-developed and well-nourished. No distress. HENT:  
Head: Normocephalic and atraumatic. Right Ear: External ear normal.  
Left Ear: External ear normal.  
Nose: Nose normal.  
Mouth/Throat: Uvula is midline, oropharynx is clear and moist and mucous membranes are normal.  
Eyes: Conjunctivae are normal. No scleral icterus. Neck: Neck supple. Cardiovascular: Normal rate, regular rhythm, normal heart sounds and intact distal pulses. Pulmonary/Chest: Effort normal and breath sounds normal.  
Abdominal: Soft. She exhibits no distension and no mass. There is tenderness (mild midline with uterus palpated approx half way to umbilicus). There is no rebound and no guarding. Musculoskeletal: She exhibits no edema. Neurological: She is alert and oriented to person, place, and time. Gait normal.  
Skin: Skin is warm and dry. She is not diaphoretic. Psychiatric: Her behavior is normal.  
Nursing note and vitals reviewed. [facility] 
 
 
ED Course Procedures Vitals: 
Patient Vitals for the past 12 hrs: 
 Temp Pulse Resp BP SpO2  
09/28/18 0033 - - - - 100 % 09/27/18 2312 98.3 °F (36.8 °C) 81 18 109/68 100 % Medications ordered:  
Medications diphenhydrAMINE (BENADRYL) 25 mg capsule (not administered)  
acetaminophen (TYLENOL) tablet 1,000 mg (1,000 mg Oral Given 9/27/18 3215) cephALEXin (KEFLEX) capsule 1,000 mg (1,000 mg Oral Given 9/28/18 0126) diphenhydrAMINE (BENADRYL) capsule 25 mg (25 mg Oral Given 9/28/18 0126) Lab findings: 
Recent Results (from the past 12 hour(s)) URINALYSIS W/ RFLX MICROSCOPIC Collection Time: 09/27/18 11:17 PM  
Result Value Ref Range Color YELLOW Appearance CLOUDY Specific gravity 1.022 1.005 - 1.030    
 pH (UA) 6.5 5.0 - 8.0 Protein NEGATIVE  NEG mg/dL Glucose NEGATIVE  NEG mg/dL Ketone NEGATIVE  NEG mg/dL Bilirubin NEGATIVE  NEG Blood NEGATIVE  NEG Urobilinogen 0.2 0.2 - 1.0 EU/dL Nitrites NEGATIVE  NEG Leukocyte Esterase LARGE (A) NEG URINE MICROSCOPIC ONLY Collection Time: 09/27/18 11:17 PM  
Result Value Ref Range WBC 4 to 10 0 - 4 /hpf  
 RBC NEGATIVE  0 - 5 /hpf Epithelial cells FEW 0 - 5 /lpf Bacteria FEW (A) NEG /hpf EKG interpretation by ED Physician: X-Ray, CT or other radiology findings or impressions: No orders to display Progress notes, Consult notes or additional Procedure notes:  
Doubt need for imaging, fht 145 by me at bedside Will treat for uti I have discussed with patient and/or family/sig other the results, interpretation of any imaging if performed, suspected diagnosis and treatment plan to include instructions regarding the diagnoses listed to which understanding was expressed with all questions answered Reevaluation of patient:  
stable Disposition: 
Diagnosis: 1. Abdominal pain complicating pregnancy 2. Acute UTI 3. Acute URI Disposition: home Follow-up Information Follow up With Details Comments Contact Info NATALY SKINNER Schedule an appointment as soon as possible for a visit  [address] [place] [address] [address]) [address] [phone] Discharge Medication List as of 9/28/2018  1:09 AM  
  
START taking these medications Details diphenhydrAMINE (BENADRYL) 25 mg capsule Take 1 Cap by mouth every six (6) hours as needed. , Print, Disp-21 Cap, R-0  
  
cephALEXin (KEFLEX) 500 mg capsule Take 1 Cap by mouth two (2) times a day for 7 days. , Print, Disp-14 Cap, R-0  
  
  
CONTINUE these medications which have CHANGED Details  
acetaminophen (TYLENOL EXTRA STRENGTH) 500 mg tablet Take 2 Tabs by mouth every six (6) hours as needed for Pain., Print, Disp-50 Tab, R-0  
  
  
CONTINUE these medications which have NOT CHANGED Details  
doxylamine-pyridoxine, vit B6, (DICLEGIS) 10-10 mg TbEC DR tablet Take 1 Tab by mouth nightly. , Print, Disp-20 Tab, R-0  
  
prenatal vitamin (PRENATAL) 28 mg iron- 800 mcg tab tablet Take 1 Tab by mouth daily. , Print, Disp-30 Tab, R-0  
  
alum-mag hydroxide-simeth (MYLANTA) 200-200-20 mg/5 mL susp Take 30 mL by mouth four (4) times daily as needed. , Print, Disp-354 mL, R-0  
  
  
 s

## 2018-09-28 NOTE — ED NOTES
I have reviewed discharge instruction and prescriptions with patient. Patient verbalized understanding and has no further questions at this time. Education taught and patient verbalized understanding of education. Teach back method used. Armband removed and shredded per patients request.   
Patients pain 1/10. Belongings given to patient. Patient discharged with  to home.

## 2018-09-29 LAB
BACTERIA SPEC CULT: NORMAL
SERVICE CMNT-IMP: NORMAL
